# Patient Record
Sex: FEMALE | Race: OTHER | URBAN - METROPOLITAN AREA
[De-identification: names, ages, dates, MRNs, and addresses within clinical notes are randomized per-mention and may not be internally consistent; named-entity substitution may affect disease eponyms.]

---

## 2017-03-19 ENCOUNTER — EMERGENCY (EMERGENCY)
Facility: HOSPITAL | Age: 57
LOS: 0 days | Discharge: HOME | End: 2017-03-19
Attending: EMERGENCY MEDICINE | Admitting: EMERGENCY MEDICINE
Payer: COMMERCIAL

## 2017-03-19 VITALS
WEIGHT: 169.98 LBS | SYSTOLIC BLOOD PRESSURE: 140 MMHG | HEIGHT: 64 IN | DIASTOLIC BLOOD PRESSURE: 80 MMHG | RESPIRATION RATE: 18 BRPM | HEART RATE: 68 BPM | OXYGEN SATURATION: 100 %

## 2017-03-19 VITALS — RESPIRATION RATE: 16 BRPM | OXYGEN SATURATION: 100 % | HEART RATE: 78 BPM | SYSTOLIC BLOOD PRESSURE: 64 MMHG

## 2017-03-19 DIAGNOSIS — R73.9 HYPERGLYCEMIA, UNSPECIFIED: ICD-10-CM

## 2017-03-19 DIAGNOSIS — Z96.659 PRESENCE OF UNSPECIFIED ARTIFICIAL KNEE JOINT: Chronic | ICD-10-CM

## 2017-03-19 DIAGNOSIS — R10.33 PERIUMBILICAL PAIN: ICD-10-CM

## 2017-03-19 DIAGNOSIS — Z98.891 HISTORY OF UTERINE SCAR FROM PREVIOUS SURGERY: Chronic | ICD-10-CM

## 2017-03-19 DIAGNOSIS — R11.2 NAUSEA WITH VOMITING, UNSPECIFIED: ICD-10-CM

## 2017-03-19 DIAGNOSIS — Z98.891 HISTORY OF UTERINE SCAR FROM PREVIOUS SURGERY: ICD-10-CM

## 2017-03-19 DIAGNOSIS — R19.7 DIARRHEA, UNSPECIFIED: ICD-10-CM

## 2017-03-19 DIAGNOSIS — R31.9 HEMATURIA, UNSPECIFIED: ICD-10-CM

## 2017-03-19 DIAGNOSIS — R10.9 UNSPECIFIED ABDOMINAL PAIN: ICD-10-CM

## 2017-03-19 LAB
ALBUMIN SERPL ELPH-MCNC: 4.1 G/DL — SIGNIFICANT CHANGE UP (ref 3.3–5)
ALP SERPL-CCNC: 89 U/L — SIGNIFICANT CHANGE UP (ref 40–120)
ALT FLD-CCNC: 33 U/L — SIGNIFICANT CHANGE UP (ref 12–78)
ANION GAP SERPL CALC-SCNC: 11 MMOL/L — SIGNIFICANT CHANGE UP (ref 5–17)
APPEARANCE UR: CLEAR — SIGNIFICANT CHANGE UP
AST SERPL-CCNC: 25 U/L — SIGNIFICANT CHANGE UP (ref 15–37)
BACTERIA # UR AUTO: (no result)
BASOPHILS # BLD AUTO: 0.1 K/UL — SIGNIFICANT CHANGE UP (ref 0–0.2)
BASOPHILS NFR BLD AUTO: 0.4 % — SIGNIFICANT CHANGE UP (ref 0–2)
BILIRUB SERPL-MCNC: 0.2 MG/DL — SIGNIFICANT CHANGE UP (ref 0.2–1.2)
BILIRUB UR-MCNC: NEGATIVE — SIGNIFICANT CHANGE UP
BUN SERPL-MCNC: 15 MG/DL — SIGNIFICANT CHANGE UP (ref 7–23)
CALCIUM SERPL-MCNC: 8.8 MG/DL — SIGNIFICANT CHANGE UP (ref 8.5–10.1)
CHLORIDE SERPL-SCNC: 104 MMOL/L — SIGNIFICANT CHANGE UP (ref 96–108)
CK SERPL-CCNC: 124 U/L — SIGNIFICANT CHANGE UP (ref 26–192)
CO2 SERPL-SCNC: 25 MMOL/L — SIGNIFICANT CHANGE UP (ref 22–31)
COLOR SPEC: YELLOW — SIGNIFICANT CHANGE UP
CREAT SERPL-MCNC: 0.91 MG/DL — SIGNIFICANT CHANGE UP (ref 0.5–1.3)
DIFF PNL FLD: (no result)
EOSINOPHIL # BLD AUTO: 0 K/UL — SIGNIFICANT CHANGE UP (ref 0–0.5)
EOSINOPHIL NFR BLD AUTO: 0.1 % — SIGNIFICANT CHANGE UP (ref 0–6)
GLUCOSE SERPL-MCNC: 282 MG/DL — HIGH (ref 70–99)
GLUCOSE UR QL: 1000 MG/DL
HCT VFR BLD CALC: 40.7 % — SIGNIFICANT CHANGE UP (ref 34.5–45)
HGB BLD-MCNC: 13.3 G/DL — SIGNIFICANT CHANGE UP (ref 11.5–15.5)
KETONES UR-MCNC: (no result)
LACTATE SERPL-SCNC: 1.7 MMOL/L — SIGNIFICANT CHANGE UP (ref 0.7–2)
LACTATE SERPL-SCNC: 3.3 MMOL/L — HIGH (ref 0.7–2)
LEUKOCYTE ESTERASE UR-ACNC: NEGATIVE — SIGNIFICANT CHANGE UP
LIDOCAIN IGE QN: 174 U/L — SIGNIFICANT CHANGE UP (ref 73–393)
LYMPHOCYTES # BLD AUTO: 12.5 % — LOW (ref 13–44)
LYMPHOCYTES # BLD AUTO: 2 K/UL — SIGNIFICANT CHANGE UP (ref 1–3.3)
MCHC RBC-ENTMCNC: 27 PG — SIGNIFICANT CHANGE UP (ref 27–34)
MCHC RBC-ENTMCNC: 32.7 GM/DL — SIGNIFICANT CHANGE UP (ref 32–36)
MCV RBC AUTO: 82.6 FL — SIGNIFICANT CHANGE UP (ref 80–100)
MONOCYTES # BLD AUTO: 0.5 K/UL — SIGNIFICANT CHANGE UP (ref 0–0.9)
MONOCYTES NFR BLD AUTO: 3.4 % — SIGNIFICANT CHANGE UP (ref 2–14)
NEUTROPHILS # BLD AUTO: 13.3 K/UL — HIGH (ref 1.8–7.4)
NEUTROPHILS NFR BLD AUTO: 83.6 % — HIGH (ref 43–77)
NITRITE UR-MCNC: NEGATIVE — SIGNIFICANT CHANGE UP
PH UR: 7 — SIGNIFICANT CHANGE UP (ref 4.8–8)
PLATELET # BLD AUTO: 313 K/UL — SIGNIFICANT CHANGE UP (ref 150–400)
POTASSIUM SERPL-MCNC: 4 MMOL/L — SIGNIFICANT CHANGE UP (ref 3.5–5.3)
POTASSIUM SERPL-SCNC: 4 MMOL/L — SIGNIFICANT CHANGE UP (ref 3.5–5.3)
PROT SERPL-MCNC: 8.4 GM/DL — HIGH (ref 6–8.3)
PROT UR-MCNC: NEGATIVE MG/DL — SIGNIFICANT CHANGE UP
RBC # BLD: 4.93 M/UL — SIGNIFICANT CHANGE UP (ref 3.8–5.2)
RBC # FLD: 12.2 % — SIGNIFICANT CHANGE UP (ref 10.3–14.5)
RBC CASTS # UR COMP ASSIST: (no result) /HPF (ref 0–4)
SODIUM SERPL-SCNC: 140 MMOL/L — SIGNIFICANT CHANGE UP (ref 135–145)
SP GR SPEC: 1.01 — SIGNIFICANT CHANGE UP (ref 1.01–1.02)
TROPONIN I SERPL-MCNC: <0.015 NG/ML — SIGNIFICANT CHANGE UP (ref 0.01–0.04)
UROBILINOGEN FLD QL: NEGATIVE MG/DL — SIGNIFICANT CHANGE UP
WBC # BLD: 15.9 K/UL — HIGH (ref 3.8–10.5)
WBC # FLD AUTO: 15.9 K/UL — HIGH (ref 3.8–10.5)
WBC UR QL: SIGNIFICANT CHANGE UP

## 2017-03-19 PROCEDURE — 93010 ELECTROCARDIOGRAM REPORT: CPT

## 2017-03-19 PROCEDURE — 71020: CPT | Mod: 26

## 2017-03-19 PROCEDURE — 74177 CT ABD & PELVIS W/CONTRAST: CPT | Mod: 26

## 2017-03-19 PROCEDURE — 99285 EMERGENCY DEPT VISIT HI MDM: CPT

## 2017-03-19 RX ORDER — SODIUM CHLORIDE 9 MG/ML
1000 INJECTION INTRAMUSCULAR; INTRAVENOUS; SUBCUTANEOUS ONCE
Qty: 0 | Refills: 0 | Status: COMPLETED | OUTPATIENT
Start: 2017-03-19 | End: 2017-03-19

## 2017-03-19 RX ADMIN — SODIUM CHLORIDE 1000 MILLILITER(S): 9 INJECTION INTRAMUSCULAR; INTRAVENOUS; SUBCUTANEOUS at 08:48

## 2017-03-19 RX ADMIN — SODIUM CHLORIDE 1000 MILLILITER(S): 9 INJECTION INTRAMUSCULAR; INTRAVENOUS; SUBCUTANEOUS at 10:27

## 2017-03-19 NOTE — ED PROVIDER NOTE - MEDICAL DECISION MAKING DETAILS
56 yo female without PMH here with acute Abd pain and associated CP. Physical exam shows nontender abdomen and normal cardiovascular exam. Consider ACS vs SBO vs Pancreatitis vs GERD vs Gastritis vs Esophageal dysfunction. Plan CBC, CMP, Cardiacs, CXR, EKG, Lipase, UA, IVF, CT abd, Symptom control as needed and reassess.

## 2017-03-19 NOTE — ED PROVIDER NOTE - ATTENDING CONTRIBUTION TO CARE
Dr. Stroud: I have personally performed a face to face bedside history and physical examination of this patient. I have discussed the history, examination, review of systems, assessment and plan of management with the resident. I have reviewed the electronic medical record and amended it to reflect my history, review of systems, physical exam, assessment and plan.

## 2017-03-19 NOTE — ED PROVIDER NOTE - PROGRESS NOTE DETAILS
ED Attdg MD Note, Dr. Stroud:    Case d/w resident.  Pt seen/evaluated.  Agree w/ initial ED management.  58 yo F, past C/S & tubal ligation, no other PMH,  BIBA from home c/o'ing severe mid-lower abd. pain awakening her up from sleep ~ 4 AM.  + Associated N/V X 3 w/o relief, + BM & flatus this AM.  Pain dull, no back pain, urine c/o's, F/C, LOC.  Some associated R chest discomfort temporarily w/o SOB.  Pain self-resolved en route to ED.  Pt reports minimal abd./ discomfort presently.  VS noted.  F adult in good comfort, not acutely ill-appearing.  PERRL/EOMI.  Neck NT, supple.  CV RRR, normal radial pulse.  Lungs CTA.  Abd: soft, BS+, no focal tenderness.  No flank/CVAT.  Exts: NT, DICKERSON x 4.  Skin: no tactile warmth, no rash.  Neuro: A+O x 3, normal speech, no focal motor/sensory deficits.   Pt declines pain meds at this time.  Labs, CT A/P pending. Patient reassessed and reports no abd pain or nausea or vomiting or diarrhea while in the ER. Abd soft, nontender, nondistended, no guarding, no rebound.

## 2017-03-19 NOTE — ED PROVIDER NOTE - CARE PLAN
Principal Discharge DX:	Periumbilical abdominal pain  Instructions for follow-up, activity and diet:	Home to follow up with her PMD and to obtain and diabetes work up as an outpatient. Principal Discharge DX:	Periumbilical abdominal pain  Instructions for follow-up, activity and diet:	Home to follow up with her PMD and to obtain and diabetes work up as an outpatient.  Secondary Diagnosis:	Hematuria Principal Discharge DX:	Periumbilical abdominal pain  Instructions for follow-up, activity and diet:	Home to follow up with her PMD and to obtain and diabetes work up as an outpatient.  Secondary Diagnosis:	Hematuria  Secondary Diagnosis:	Hyperglycemia

## 2017-03-19 NOTE — ED PROVIDER NOTE - PHYSICAL EXAMINATION
A/OX3. Well appearing female. Lungs CTAB. Cardiac S1S2 noted, RRR. Abd soft, nontender, nondistended. No CVA tenderness. No focal weakness. No LE edema. EOMI, PERRL.

## 2017-05-23 ENCOUNTER — EMERGENCY (EMERGENCY)
Facility: HOSPITAL | Age: 57
LOS: 0 days | Discharge: HOME | End: 2017-05-23
Attending: EMERGENCY MEDICINE | Admitting: EMERGENCY MEDICINE
Payer: COMMERCIAL

## 2017-05-23 VITALS
SYSTOLIC BLOOD PRESSURE: 127 MMHG | RESPIRATION RATE: 17 BRPM | TEMPERATURE: 98 F | OXYGEN SATURATION: 100 % | HEART RATE: 77 BPM | DIASTOLIC BLOOD PRESSURE: 68 MMHG

## 2017-05-23 VITALS — HEIGHT: 66 IN | WEIGHT: 169.98 LBS

## 2017-05-23 DIAGNOSIS — R19.7 DIARRHEA, UNSPECIFIED: ICD-10-CM

## 2017-05-23 DIAGNOSIS — N20.0 CALCULUS OF KIDNEY: ICD-10-CM

## 2017-05-23 DIAGNOSIS — Z98.891 HISTORY OF UTERINE SCAR FROM PREVIOUS SURGERY: Chronic | ICD-10-CM

## 2017-05-23 DIAGNOSIS — Z96.659 PRESENCE OF UNSPECIFIED ARTIFICIAL KNEE JOINT: Chronic | ICD-10-CM

## 2017-05-23 DIAGNOSIS — R10.9 UNSPECIFIED ABDOMINAL PAIN: ICD-10-CM

## 2017-05-23 LAB
ALBUMIN SERPL ELPH-MCNC: 3.9 G/DL — SIGNIFICANT CHANGE UP (ref 3.3–5)
ALP SERPL-CCNC: 88 U/L — SIGNIFICANT CHANGE UP (ref 40–120)
ALT FLD-CCNC: 38 U/L — SIGNIFICANT CHANGE UP (ref 12–78)
ANION GAP SERPL CALC-SCNC: 10 MMOL/L — SIGNIFICANT CHANGE UP (ref 5–17)
APPEARANCE UR: (no result)
AST SERPL-CCNC: 25 U/L — SIGNIFICANT CHANGE UP (ref 15–37)
BACTERIA # UR AUTO: (no result)
BASOPHILS # BLD AUTO: 0.1 K/UL — SIGNIFICANT CHANGE UP (ref 0–0.2)
BASOPHILS NFR BLD AUTO: 0.4 % — SIGNIFICANT CHANGE UP (ref 0–2)
BILIRUB SERPL-MCNC: 0.3 MG/DL — SIGNIFICANT CHANGE UP (ref 0.2–1.2)
BILIRUB UR-MCNC: NEGATIVE — SIGNIFICANT CHANGE UP
BUN SERPL-MCNC: 13 MG/DL — SIGNIFICANT CHANGE UP (ref 7–23)
CALCIUM SERPL-MCNC: 9.2 MG/DL — SIGNIFICANT CHANGE UP (ref 8.5–10.1)
CHLORIDE SERPL-SCNC: 103 MMOL/L — SIGNIFICANT CHANGE UP (ref 96–108)
CO2 SERPL-SCNC: 24 MMOL/L — SIGNIFICANT CHANGE UP (ref 22–31)
COLOR SPEC: YELLOW — SIGNIFICANT CHANGE UP
CREAT SERPL-MCNC: 1.06 MG/DL — SIGNIFICANT CHANGE UP (ref 0.5–1.3)
DIFF PNL FLD: (no result)
EOSINOPHIL # BLD AUTO: 0 K/UL — SIGNIFICANT CHANGE UP (ref 0–0.5)
EOSINOPHIL NFR BLD AUTO: 0.1 % — SIGNIFICANT CHANGE UP (ref 0–6)
EPI CELLS # UR: SIGNIFICANT CHANGE UP
GLUCOSE SERPL-MCNC: 228 MG/DL — HIGH (ref 70–99)
GLUCOSE UR QL: 1000 MG/DL
HCT VFR BLD CALC: 41 % — SIGNIFICANT CHANGE UP (ref 34.5–45)
HGB BLD-MCNC: 13.2 G/DL — SIGNIFICANT CHANGE UP (ref 11.5–15.5)
KETONES UR-MCNC: (no result)
LEUKOCYTE ESTERASE UR-ACNC: (no result)
LIDOCAIN IGE QN: 211 U/L — SIGNIFICANT CHANGE UP (ref 73–393)
LYMPHOCYTES # BLD AUTO: 1.4 K/UL — SIGNIFICANT CHANGE UP (ref 1–3.3)
LYMPHOCYTES # BLD AUTO: 9.1 % — LOW (ref 13–44)
MCHC RBC-ENTMCNC: 27 PG — SIGNIFICANT CHANGE UP (ref 27–34)
MCHC RBC-ENTMCNC: 32.3 GM/DL — SIGNIFICANT CHANGE UP (ref 32–36)
MCV RBC AUTO: 83.7 FL — SIGNIFICANT CHANGE UP (ref 80–100)
MONOCYTES # BLD AUTO: 0.4 K/UL — SIGNIFICANT CHANGE UP (ref 0–0.9)
MONOCYTES NFR BLD AUTO: 2.8 % — SIGNIFICANT CHANGE UP (ref 2–14)
NEUTROPHILS # BLD AUTO: 13.9 K/UL — HIGH (ref 1.8–7.4)
NEUTROPHILS NFR BLD AUTO: 87.6 % — HIGH (ref 43–77)
NITRITE UR-MCNC: POSITIVE
PH UR: 8 — SIGNIFICANT CHANGE UP (ref 5–8)
PLATELET # BLD AUTO: 338 K/UL — SIGNIFICANT CHANGE UP (ref 150–400)
POTASSIUM SERPL-MCNC: 4.3 MMOL/L — SIGNIFICANT CHANGE UP (ref 3.5–5.3)
POTASSIUM SERPL-SCNC: 4.3 MMOL/L — SIGNIFICANT CHANGE UP (ref 3.5–5.3)
PROT SERPL-MCNC: 8.6 GM/DL — HIGH (ref 6–8.3)
PROT UR-MCNC: 30 MG/DL
RBC # BLD: 4.9 M/UL — SIGNIFICANT CHANGE UP (ref 3.8–5.2)
RBC # FLD: 12.6 % — SIGNIFICANT CHANGE UP (ref 10.3–14.5)
RBC CASTS # UR COMP ASSIST: >50 /HPF (ref 0–4)
SODIUM SERPL-SCNC: 137 MMOL/L — SIGNIFICANT CHANGE UP (ref 135–145)
SP GR SPEC: 1.01 — SIGNIFICANT CHANGE UP (ref 1.01–1.02)
UROBILINOGEN FLD QL: NEGATIVE MG/DL — SIGNIFICANT CHANGE UP
WBC # BLD: 15.9 K/UL — HIGH (ref 3.8–10.5)
WBC # FLD AUTO: 15.9 K/UL — HIGH (ref 3.8–10.5)
WBC UR QL: SIGNIFICANT CHANGE UP

## 2017-05-23 PROCEDURE — 74177 CT ABD & PELVIS W/CONTRAST: CPT | Mod: 26

## 2017-05-23 PROCEDURE — 99284 EMERGENCY DEPT VISIT MOD MDM: CPT

## 2017-05-23 RX ORDER — CIPROFLOXACIN LACTATE 400MG/40ML
400 VIAL (ML) INTRAVENOUS ONCE
Qty: 0 | Refills: 0 | Status: COMPLETED | OUTPATIENT
Start: 2017-05-23 | End: 2017-05-23

## 2017-05-23 RX ORDER — ONDANSETRON 8 MG/1
4 TABLET, FILM COATED ORAL ONCE
Qty: 0 | Refills: 0 | Status: COMPLETED | OUTPATIENT
Start: 2017-05-23 | End: 2017-05-23

## 2017-05-23 RX ORDER — ONDANSETRON 8 MG/1
1 TABLET, FILM COATED ORAL
Qty: 21 | Refills: 0 | OUTPATIENT
Start: 2017-05-23 | End: 2017-05-30

## 2017-05-23 RX ORDER — SODIUM CHLORIDE 9 MG/ML
1000 INJECTION INTRAMUSCULAR; INTRAVENOUS; SUBCUTANEOUS ONCE
Qty: 0 | Refills: 0 | Status: COMPLETED | OUTPATIENT
Start: 2017-05-23 | End: 2017-05-23

## 2017-05-23 RX ORDER — MOXIFLOXACIN HYDROCHLORIDE TABLETS, 400 MG 400 MG/1
1 TABLET, FILM COATED ORAL
Qty: 20 | Refills: 0 | OUTPATIENT
Start: 2017-05-23 | End: 2017-05-27

## 2017-05-23 RX ORDER — MORPHINE SULFATE 50 MG/1
4 CAPSULE, EXTENDED RELEASE ORAL ONCE
Qty: 0 | Refills: 0 | Status: DISCONTINUED | OUTPATIENT
Start: 2017-05-23 | End: 2017-05-23

## 2017-05-23 RX ORDER — MORPHINE SULFATE 50 MG/1
6 CAPSULE, EXTENDED RELEASE ORAL ONCE
Qty: 0 | Refills: 0 | Status: DISCONTINUED | OUTPATIENT
Start: 2017-05-23 | End: 2017-05-23

## 2017-05-23 RX ORDER — OXYCODONE HYDROCHLORIDE 5 MG/1
1 TABLET ORAL
Qty: 10 | Refills: 0 | OUTPATIENT
Start: 2017-05-23 | End: 2017-05-28

## 2017-05-23 RX ADMIN — MORPHINE SULFATE 6 MILLIGRAM(S): 50 CAPSULE, EXTENDED RELEASE ORAL at 20:00

## 2017-05-23 RX ADMIN — MORPHINE SULFATE 4 MILLIGRAM(S): 50 CAPSULE, EXTENDED RELEASE ORAL at 17:19

## 2017-05-23 RX ADMIN — MORPHINE SULFATE 6 MILLIGRAM(S): 50 CAPSULE, EXTENDED RELEASE ORAL at 20:30

## 2017-05-23 RX ADMIN — ONDANSETRON 4 MILLIGRAM(S): 8 TABLET, FILM COATED ORAL at 20:00

## 2017-05-23 RX ADMIN — SODIUM CHLORIDE 1000 MILLILITER(S): 9 INJECTION INTRAMUSCULAR; INTRAVENOUS; SUBCUTANEOUS at 17:19

## 2017-05-23 RX ADMIN — ONDANSETRON 4 MILLIGRAM(S): 8 TABLET, FILM COATED ORAL at 17:19

## 2017-05-23 RX ADMIN — Medication 200 MILLIGRAM(S): at 22:08

## 2017-05-23 RX ADMIN — MORPHINE SULFATE 4 MILLIGRAM(S): 50 CAPSULE, EXTENDED RELEASE ORAL at 17:49

## 2017-05-23 NOTE — ED STATDOCS - OBJECTIVE STATEMENT
56 y/o female with PSHx , 26 years ago c/o diffuse abd pain x few hours ago, worse on right side. Last BM was this morning, vomited about 8 times today and had diarrhea. pt was here 2 months ago for same abd pain and a CT scan was done that found no significant results. Allergic to PCN. Recent travel to [place] 1 month ago. Non smoker. No alcohol or drug use. Dr. Miranda, PMD.

## 2017-05-23 NOTE — ED STATDOCS - PROGRESS NOTE DETAILS
58 y/o female without PMH here with severe abd pain. Patient reports periumbilical and diffuse abd pain since this AM reported as sharp/throbbing and associated with NBNB vomiting and one episode of diarrhea. Patient recently came back from [place]. Denies fever, melena, BRBPR, back pain, hx of kidney stones, urinary symptoms, LE edema, hx of SBO. Exam shows diffuse abd tenderness w/o rebound, w/o guarding. No CVA tenderness. Normal lung exam. Normal skin. No LE edema. Consider appendicitis, SBO, Colitis, Diverticulitis, Ovarian torsion, UTI, Kidney stone. Plan CBC, CMP, CXR, UA, IVF, Pain control and reassess.

## 2017-05-23 NOTE — ED STATDOCS - ATTENDING CONTRIBUTION TO CARE
I, Jerman Delaney MD,  performed the initial face to face bedside interview with this patient regarding history of present illness, review of symptoms and relevant past medical, social and family history.  I completed an independent physical examination.  I was the initial provider who evaluated this patient. I have signed out the follow up of any pending tests (i.e. labs, radiological studies) to the resident.  I have communicated the patient’s plan of care and disposition with the resident.  The history, relevant review of systems, past medical and surgical history, medical decision making, and physical examination was documented by the scribe in my presence and I attest to the accuracy of the documentation.

## 2017-05-23 NOTE — ED STATDOCS - NS ED MD SCRIBE ATTENDING SCRIBE SECTIONS
HISTORY OF PRESENT ILLNESS/PAST MEDICAL/SURGICAL/SOCIAL HISTORY/REVIEW OF SYSTEMS/PHYSICAL EXAM/DISPOSITION/VITAL SIGNS( Pullset)/PROGRESS NOTE/RESULTS

## 2017-05-23 NOTE — ED STATDOCS - MEDICAL DECISION MAKING DETAILS
56 y/o female presents to the ED c/o abd pain. Will order labs, CT scan abd and meds. Then re-assess.

## 2017-05-24 LAB
CULTURE RESULTS: SIGNIFICANT CHANGE UP
SPECIMEN SOURCE: SIGNIFICANT CHANGE UP

## 2017-05-28 ENCOUNTER — INPATIENT (INPATIENT)
Facility: HOSPITAL | Age: 57
LOS: 1 days | Discharge: HOME | End: 2017-05-30
Attending: INTERNAL MEDICINE | Admitting: INTERNAL MEDICINE
Payer: COMMERCIAL

## 2017-05-28 VITALS
TEMPERATURE: 98 F | HEART RATE: 86 BPM | DIASTOLIC BLOOD PRESSURE: 86 MMHG | OXYGEN SATURATION: 100 % | SYSTOLIC BLOOD PRESSURE: 137 MMHG | HEIGHT: 67 IN | RESPIRATION RATE: 18 BRPM | WEIGHT: 179.9 LBS

## 2017-05-28 DIAGNOSIS — Z98.891 HISTORY OF UTERINE SCAR FROM PREVIOUS SURGERY: Chronic | ICD-10-CM

## 2017-05-28 DIAGNOSIS — Z96.659 PRESENCE OF UNSPECIFIED ARTIFICIAL KNEE JOINT: Chronic | ICD-10-CM

## 2017-05-28 LAB
ALBUMIN SERPL ELPH-MCNC: 4 G/DL — SIGNIFICANT CHANGE UP (ref 3.3–5)
ALP SERPL-CCNC: 82 U/L — SIGNIFICANT CHANGE UP (ref 40–120)
ALT FLD-CCNC: 38 U/L — SIGNIFICANT CHANGE UP (ref 12–78)
ANION GAP SERPL CALC-SCNC: 9 MMOL/L — SIGNIFICANT CHANGE UP (ref 5–17)
APPEARANCE UR: CLEAR — SIGNIFICANT CHANGE UP
AST SERPL-CCNC: 24 U/L — SIGNIFICANT CHANGE UP (ref 15–37)
BACTERIA # UR AUTO: (no result)
BASOPHILS # BLD AUTO: 0.1 K/UL — SIGNIFICANT CHANGE UP (ref 0–0.2)
BASOPHILS NFR BLD AUTO: 0.4 % — SIGNIFICANT CHANGE UP (ref 0–2)
BILIRUB SERPL-MCNC: 0.6 MG/DL — SIGNIFICANT CHANGE UP (ref 0.2–1.2)
BILIRUB UR-MCNC: NEGATIVE — SIGNIFICANT CHANGE UP
BUN SERPL-MCNC: 16 MG/DL — SIGNIFICANT CHANGE UP (ref 7–23)
CALCIUM SERPL-MCNC: 9.1 MG/DL — SIGNIFICANT CHANGE UP (ref 8.5–10.1)
CHLORIDE SERPL-SCNC: 103 MMOL/L — SIGNIFICANT CHANGE UP (ref 96–108)
CO2 SERPL-SCNC: 24 MMOL/L — SIGNIFICANT CHANGE UP (ref 22–31)
COLOR SPEC: YELLOW — SIGNIFICANT CHANGE UP
COMMENT - URINE: SIGNIFICANT CHANGE UP
CREAT SERPL-MCNC: 1.29 MG/DL — SIGNIFICANT CHANGE UP (ref 0.5–1.3)
DIFF PNL FLD: (no result)
EOSINOPHIL # BLD AUTO: 0 K/UL — SIGNIFICANT CHANGE UP (ref 0–0.5)
EOSINOPHIL NFR BLD AUTO: 0.2 % — SIGNIFICANT CHANGE UP (ref 0–6)
EPI CELLS # UR: SIGNIFICANT CHANGE UP
GLUCOSE SERPL-MCNC: 222 MG/DL — HIGH (ref 70–99)
GLUCOSE UR QL: 250 MG/DL
HCT VFR BLD CALC: 39.9 % — SIGNIFICANT CHANGE UP (ref 34.5–45)
HGB BLD-MCNC: 13.5 G/DL — SIGNIFICANT CHANGE UP (ref 11.5–15.5)
KETONES UR-MCNC: (no result)
LEUKOCYTE ESTERASE UR-ACNC: (no result)
LYMPHOCYTES # BLD AUTO: 14.3 % — SIGNIFICANT CHANGE UP (ref 13–44)
LYMPHOCYTES # BLD AUTO: 2.1 K/UL — SIGNIFICANT CHANGE UP (ref 1–3.3)
MCHC RBC-ENTMCNC: 27.3 PG — SIGNIFICANT CHANGE UP (ref 27–34)
MCHC RBC-ENTMCNC: 34 GM/DL — SIGNIFICANT CHANGE UP (ref 32–36)
MCV RBC AUTO: 80.3 FL — SIGNIFICANT CHANGE UP (ref 80–100)
MONOCYTES # BLD AUTO: 0.7 K/UL — SIGNIFICANT CHANGE UP (ref 0–0.9)
MONOCYTES NFR BLD AUTO: 4.8 % — SIGNIFICANT CHANGE UP (ref 2–14)
NEUTROPHILS # BLD AUTO: 11.6 K/UL — HIGH (ref 1.8–7.4)
NEUTROPHILS NFR BLD AUTO: 80.3 % — HIGH (ref 43–77)
NITRITE UR-MCNC: NEGATIVE — SIGNIFICANT CHANGE UP
PH UR: 6.5 — SIGNIFICANT CHANGE UP (ref 5–8)
PLATELET # BLD AUTO: 343 K/UL — SIGNIFICANT CHANGE UP (ref 150–400)
POTASSIUM SERPL-MCNC: 4.2 MMOL/L — SIGNIFICANT CHANGE UP (ref 3.5–5.3)
POTASSIUM SERPL-SCNC: 4.2 MMOL/L — SIGNIFICANT CHANGE UP (ref 3.5–5.3)
PROT SERPL-MCNC: 8.6 GM/DL — HIGH (ref 6–8.3)
PROT UR-MCNC: 30 MG/DL
RBC # BLD: 4.96 M/UL — SIGNIFICANT CHANGE UP (ref 3.8–5.2)
RBC # FLD: 12.2 % — SIGNIFICANT CHANGE UP (ref 10.3–14.5)
RBC CASTS # UR COMP ASSIST: (no result) /HPF (ref 0–4)
SODIUM SERPL-SCNC: 136 MMOL/L — SIGNIFICANT CHANGE UP (ref 135–145)
SP GR SPEC: 1.02 — SIGNIFICANT CHANGE UP (ref 1.01–1.02)
UROBILINOGEN FLD QL: NEGATIVE MG/DL — SIGNIFICANT CHANGE UP
WBC # BLD: 14.5 K/UL — HIGH (ref 3.8–10.5)
WBC # FLD AUTO: 14.5 K/UL — HIGH (ref 3.8–10.5)
WBC UR QL: SIGNIFICANT CHANGE UP

## 2017-05-28 PROCEDURE — 99284 EMERGENCY DEPT VISIT MOD MDM: CPT

## 2017-05-28 RX ORDER — ENOXAPARIN SODIUM 100 MG/ML
40 INJECTION SUBCUTANEOUS EVERY 24 HOURS
Qty: 0 | Refills: 0 | Status: DISCONTINUED | OUTPATIENT
Start: 2017-05-28 | End: 2017-05-30

## 2017-05-28 RX ORDER — ONDANSETRON 8 MG/1
4 TABLET, FILM COATED ORAL ONCE
Qty: 0 | Refills: 0 | Status: COMPLETED | OUTPATIENT
Start: 2017-05-28 | End: 2017-05-28

## 2017-05-28 RX ORDER — AZTREONAM 2 G
1000 VIAL (EA) INJECTION EVERY 8 HOURS
Qty: 0 | Refills: 0 | Status: DISCONTINUED | OUTPATIENT
Start: 2017-05-28 | End: 2017-05-28

## 2017-05-28 RX ORDER — ONDANSETRON 8 MG/1
8 TABLET, FILM COATED ORAL ONCE
Qty: 0 | Refills: 0 | Status: COMPLETED | OUTPATIENT
Start: 2017-05-28 | End: 2017-05-28

## 2017-05-28 RX ORDER — SODIUM CHLORIDE 9 MG/ML
1000 INJECTION INTRAMUSCULAR; INTRAVENOUS; SUBCUTANEOUS
Qty: 0 | Refills: 0 | Status: DISCONTINUED | OUTPATIENT
Start: 2017-05-28 | End: 2017-05-30

## 2017-05-28 RX ORDER — CEFTRIAXONE 500 MG/1
1 INJECTION, POWDER, FOR SOLUTION INTRAMUSCULAR; INTRAVENOUS EVERY 24 HOURS
Qty: 0 | Refills: 0 | Status: DISCONTINUED | OUTPATIENT
Start: 2017-05-29 | End: 2017-05-30

## 2017-05-28 RX ORDER — DEXTROSE 50 % IN WATER 50 %
12.5 SYRINGE (ML) INTRAVENOUS ONCE
Qty: 0 | Refills: 0 | Status: DISCONTINUED | OUTPATIENT
Start: 2017-05-28 | End: 2017-05-30

## 2017-05-28 RX ORDER — ONDANSETRON 8 MG/1
4 TABLET, FILM COATED ORAL EVERY 6 HOURS
Qty: 0 | Refills: 0 | Status: DISCONTINUED | OUTPATIENT
Start: 2017-05-28 | End: 2017-05-30

## 2017-05-28 RX ORDER — SODIUM CHLORIDE 9 MG/ML
1000 INJECTION, SOLUTION INTRAVENOUS
Qty: 0 | Refills: 0 | Status: DISCONTINUED | OUTPATIENT
Start: 2017-05-28 | End: 2017-05-30

## 2017-05-28 RX ORDER — AZTREONAM 2 G
VIAL (EA) INJECTION
Qty: 0 | Refills: 0 | Status: DISCONTINUED | OUTPATIENT
Start: 2017-05-28 | End: 2017-05-28

## 2017-05-28 RX ORDER — GLUCAGON INJECTION, SOLUTION 0.5 MG/.1ML
1 INJECTION, SOLUTION SUBCUTANEOUS ONCE
Qty: 0 | Refills: 0 | Status: DISCONTINUED | OUTPATIENT
Start: 2017-05-28 | End: 2017-05-30

## 2017-05-28 RX ORDER — DEXTROSE 50 % IN WATER 50 %
1 SYRINGE (ML) INTRAVENOUS ONCE
Qty: 0 | Refills: 0 | Status: DISCONTINUED | OUTPATIENT
Start: 2017-05-28 | End: 2017-05-30

## 2017-05-28 RX ORDER — SODIUM CHLORIDE 9 MG/ML
1000 INJECTION INTRAMUSCULAR; INTRAVENOUS; SUBCUTANEOUS ONCE
Qty: 0 | Refills: 0 | Status: COMPLETED | OUTPATIENT
Start: 2017-05-28 | End: 2017-05-28

## 2017-05-28 RX ORDER — METOCLOPRAMIDE HCL 10 MG
10 TABLET ORAL ONCE
Qty: 0 | Refills: 0 | Status: COMPLETED | OUTPATIENT
Start: 2017-05-28 | End: 2017-05-28

## 2017-05-28 RX ORDER — DEXTROSE 50 % IN WATER 50 %
25 SYRINGE (ML) INTRAVENOUS ONCE
Qty: 0 | Refills: 0 | Status: DISCONTINUED | OUTPATIENT
Start: 2017-05-28 | End: 2017-05-30

## 2017-05-28 RX ORDER — CEFTRIAXONE 500 MG/1
INJECTION, POWDER, FOR SOLUTION INTRAMUSCULAR; INTRAVENOUS
Qty: 0 | Refills: 0 | Status: DISCONTINUED | OUTPATIENT
Start: 2017-05-28 | End: 2017-05-30

## 2017-05-28 RX ORDER — CEFTRIAXONE 500 MG/1
1 INJECTION, POWDER, FOR SOLUTION INTRAMUSCULAR; INTRAVENOUS ONCE
Qty: 0 | Refills: 0 | Status: COMPLETED | OUTPATIENT
Start: 2017-05-28 | End: 2017-05-28

## 2017-05-28 RX ORDER — HYDROMORPHONE HYDROCHLORIDE 2 MG/ML
1 INJECTION INTRAMUSCULAR; INTRAVENOUS; SUBCUTANEOUS EVERY 4 HOURS
Qty: 0 | Refills: 0 | Status: DISCONTINUED | OUTPATIENT
Start: 2017-05-28 | End: 2017-05-30

## 2017-05-28 RX ORDER — INSULIN LISPRO 100/ML
VIAL (ML) SUBCUTANEOUS
Qty: 0 | Refills: 0 | Status: DISCONTINUED | OUTPATIENT
Start: 2017-05-28 | End: 2017-05-30

## 2017-05-28 RX ORDER — KETOROLAC TROMETHAMINE 30 MG/ML
30 SYRINGE (ML) INJECTION ONCE
Qty: 0 | Refills: 0 | Status: DISCONTINUED | OUTPATIENT
Start: 2017-05-28 | End: 2017-05-28

## 2017-05-28 RX ORDER — AZTREONAM 2 G
1000 VIAL (EA) INJECTION ONCE
Qty: 0 | Refills: 0 | Status: DISCONTINUED | OUTPATIENT
Start: 2017-05-28 | End: 2017-05-28

## 2017-05-28 RX ORDER — FAMOTIDINE 10 MG/ML
20 INJECTION INTRAVENOUS ONCE
Qty: 0 | Refills: 0 | Status: COMPLETED | OUTPATIENT
Start: 2017-05-28 | End: 2017-05-28

## 2017-05-28 RX ORDER — SENNA PLUS 8.6 MG/1
2 TABLET ORAL AT BEDTIME
Qty: 0 | Refills: 0 | Status: DISCONTINUED | OUTPATIENT
Start: 2017-05-28 | End: 2017-05-30

## 2017-05-28 RX ORDER — DOCUSATE SODIUM 100 MG
100 CAPSULE ORAL THREE TIMES A DAY
Qty: 0 | Refills: 0 | Status: DISCONTINUED | OUTPATIENT
Start: 2017-05-28 | End: 2017-05-30

## 2017-05-28 RX ORDER — ACETAMINOPHEN 500 MG
650 TABLET ORAL EVERY 6 HOURS
Qty: 0 | Refills: 0 | Status: DISCONTINUED | OUTPATIENT
Start: 2017-05-28 | End: 2017-05-30

## 2017-05-28 RX ORDER — DIPHENHYDRAMINE HCL 50 MG
25 CAPSULE ORAL ONCE
Qty: 0 | Refills: 0 | Status: COMPLETED | OUTPATIENT
Start: 2017-05-28 | End: 2017-05-28

## 2017-05-28 RX ORDER — CIPROFLOXACIN LACTATE 400MG/40ML
400 VIAL (ML) INTRAVENOUS ONCE
Qty: 0 | Refills: 0 | Status: COMPLETED | OUTPATIENT
Start: 2017-05-28 | End: 2017-05-28

## 2017-05-28 RX ADMIN — ONDANSETRON 4 MILLIGRAM(S): 8 TABLET, FILM COATED ORAL at 16:29

## 2017-05-28 RX ADMIN — CEFTRIAXONE 100 GRAM(S): 500 INJECTION, POWDER, FOR SOLUTION INTRAMUSCULAR; INTRAVENOUS at 17:57

## 2017-05-28 RX ADMIN — CEFTRIAXONE 100 GRAM(S): 500 INJECTION, POWDER, FOR SOLUTION INTRAMUSCULAR; INTRAVENOUS at 11:27

## 2017-05-28 RX ADMIN — Medication 25 MILLIGRAM(S): at 06:52

## 2017-05-28 RX ADMIN — SODIUM CHLORIDE 2000 MILLILITER(S): 9 INJECTION INTRAMUSCULAR; INTRAVENOUS; SUBCUTANEOUS at 11:19

## 2017-05-28 RX ADMIN — FAMOTIDINE 20 MILLIGRAM(S): 10 INJECTION INTRAVENOUS at 03:04

## 2017-05-28 RX ADMIN — HYDROMORPHONE HYDROCHLORIDE 1 MILLIGRAM(S): 2 INJECTION INTRAMUSCULAR; INTRAVENOUS; SUBCUTANEOUS at 18:44

## 2017-05-28 RX ADMIN — HYDROMORPHONE HYDROCHLORIDE 1 MILLIGRAM(S): 2 INJECTION INTRAMUSCULAR; INTRAVENOUS; SUBCUTANEOUS at 23:55

## 2017-05-28 RX ADMIN — Medication 30 MILLIGRAM(S): at 03:20

## 2017-05-28 RX ADMIN — ONDANSETRON 4 MILLIGRAM(S): 8 TABLET, FILM COATED ORAL at 11:19

## 2017-05-28 RX ADMIN — Medication 200 MILLIGRAM(S): at 03:38

## 2017-05-28 RX ADMIN — ENOXAPARIN SODIUM 40 MILLIGRAM(S): 100 INJECTION SUBCUTANEOUS at 17:58

## 2017-05-28 RX ADMIN — ONDANSETRON 4 MILLIGRAM(S): 8 TABLET, FILM COATED ORAL at 13:50

## 2017-05-28 RX ADMIN — ONDANSETRON 8 MILLIGRAM(S): 8 TABLET, FILM COATED ORAL at 03:05

## 2017-05-28 RX ADMIN — Medication 100 MILLIGRAM(S): at 21:31

## 2017-05-28 RX ADMIN — SODIUM CHLORIDE 1000 MILLILITER(S): 9 INJECTION INTRAMUSCULAR; INTRAVENOUS; SUBCUTANEOUS at 03:05

## 2017-05-28 RX ADMIN — Medication 10 MILLIGRAM(S): at 06:53

## 2017-05-28 RX ADMIN — Medication 30 MILLIGRAM(S): at 03:04

## 2017-05-28 RX ADMIN — HYDROMORPHONE HYDROCHLORIDE 1 MILLIGRAM(S): 2 INJECTION INTRAMUSCULAR; INTRAVENOUS; SUBCUTANEOUS at 18:32

## 2017-05-28 NOTE — ED PROVIDER NOTE - DETAILS:
Olamide Jacome MD - The scribe's documentation has been prepared under my direction and personally reviewed by me in its entirety. I confirm that the note above accurately reflects all work, treatment, procedures, and medical decision making performed by me.

## 2017-05-28 NOTE — CONSULT NOTE ADULT - SUBJECTIVE AND OBJECTIVE BOX
Patient is a 57y old  Female who presents with a chief complaint of pain and nausea (28 May 2017 14:57)    HPI:  56 y/o Female with h/o borderline DM type 2, recent right UVJ kidney stone and UTI was admitted on  for recurrent right flank pain. She was seen in ER on 17 and was dx with 3 mm right UVJ stone with right hydroureteronephrosis and UTI and was d/pushpa on cipro PO. SHe was uncompliant with taking the cipro due to N/V. She came back sec to increasing right flank pain, n/v. Denies any fever.       PMH: as above  PSH: as above  Meds: per reconciliation sheet, noted below  MEDICATIONS  (STANDING):  enoxaparin Injectable 40milliGRAM(s) SubCutaneous every 24 hours  sodium chloride 0.9%. 1000milliLiter(s) IV Continuous <Continuous>  docusate sodium 100milliGRAM(s) Oral three times a day  aztreonam  IVPB  IV Intermittent   aztreonam  IVPB 1000milliGRAM(s) IV Intermittent once  aztreonam  IVPB 1000milliGRAM(s) IV Intermittent every 8 hours  insulin lispro (HumaLOG) corrective regimen sliding scale  SubCutaneous three times a day before meals  dextrose 5%. 1000milliLiter(s) IV Continuous <Continuous>  dextrose 50% Injectable 12.5Gram(s) IV Push once  dextrose 50% Injectable 25Gram(s) IV Push once  dextrose 50% Injectable 25Gram(s) IV Push once    MEDICATIONS  (PRN):  acetaminophen   Tablet. 650milliGRAM(s) Oral every 6 hours PRN Mild Pain (1 - 3)  ondansetron Injectable 4milliGRAM(s) IV Push every 6 hours PRN Nausea  senna 2Tablet(s) Oral at bedtime PRN Constipation  HYDROmorphone  Injectable 1milliGRAM(s) IV Push every 4 hours PRN Moderate Pain (4 - 6)  dextrose Gel 1Dose(s) Oral once PRN Blood Glucose LESS THAN 70 milliGRAM(s)/deciliter  glucagon  Injectable 1milliGRAM(s) IntraMuscular once PRN Glucose LESS THAN 70 milligrams/deciliter    Allergies    penicillins (Hives)    Intolerances      Social: no smoking, no alcohol, no illegal drugs; no recent travel, no exposure to TB  FAMILY HISTORY:  No pertinent family history in first degree relatives    ROS: the patient denies fever, no chills, no HA, no dizziness, no sore throat, no blurry vision, no CP, no palpitations, no SOB, no cough, no abdominal pain, has right flank pain, no diarrhea, had N/V, no dysuria, no leg pain, no claudication, no rash, no joint aches, no rectal pain or bleeding, no night sweats    Vital Signs Last 24 Hrs  T(C): 36.9, Max: 36.9 ( @ 01:44)  T(F): 98.5, Max: 98.5 ( @ 01:44)  HR: 73 (73 - 86)  BP: 150/75 (137/86 - 150/75)  BP(mean): --  RR: 15 (15 - 18)  SpO2: 99% (99% - 100%)  Daily Height in cm: 170.18 (28 May 2017 01:44)    Daily     PE:    Constitutional: frail looking  HEENT: NC/AT, EOMI, PERRLA  Neck: supple  Back: no tenderness  Respiratory: clear  Cardiovascular: S1S2 regular, no murmurs  Abdomen: soft, not tender, not distended, positive BS  Genitourinary: right flank tenderness  Rectal: deferred  Musculoskeletal: no muscle tenderness, no joint swelling or tenderness  Extremities: no pedal edema  Neurological: AxOx3, moving all extremities, no focal deficits  Skin: no rashes    Labs:                        13.5   14.5  )-----------( 343      ( 28 May 2017 02:13 )             39.9         136  |  103  |  16  ----------------------------<  222<H>  4.2   |  24  |  1.29    Ca    9.1      28 May 2017 02:13    TPro  8.6<H>  /  Alb  4.0  /  TBili  0.6  /  DBili  x   /  AST  24  /  ALT  38  /  AlkPhos  82       LIVER FUNCTIONS - ( 28 May 2017 02:13 )  Alb: 4.0 g/dL / Pro: 8.6 gm/dL / ALK PHOS: 82 U/L / ALT: 38 U/L / AST: 24 U/L / GGT: x           Urinalysis Basic - ( 28 May 2017 02:13 )    Color: Yellow / Appearance: Clear / S.020 / pH: x  Gluc: x / Ketone: Moderate  / Bili: Negative / Urobili: Negative mg/dL   Blood: x / Protein: 30 mg/dL / Nitrite: Negative   Leuk Esterase: Trace / RBC: 3-5 /HPF / WBC 3-5   Sq Epi: x / Non Sq Epi: Occasional / Bacteria: Occasional          Radiology:     CT abdomen and pelvis:  Mild right hydroureteronephrosis secondary to a distal right ureter   calculus measuring 0.3 cm just proximal to the right ureterovesicular   junction. Minimal right renal edema     Advanced directives addressed: full resuscitation Patient is a 57y old  Female who presents with a chief complaint of pain and nausea (28 May 2017 14:57)    HPI:  58 y/o Female with h/o borderline DM type 2, recent right UVJ kidney stone and UTI was admitted on  for recurrent right flank pain. She was seen in ER on 17 and was dx with 3 mm right UVJ stone with right hydroureteronephrosis and UTI and was d/pushpa on cipro PO. SHe was uncompliant with taking the cipro due to N/V. She came back sec to increasing right flank pain, n/v. Denies any fever.       PMH: as above  PSH: as above  Meds: per reconciliation sheet, noted below  MEDICATIONS  (STANDING):  enoxaparin Injectable 40milliGRAM(s) SubCutaneous every 24 hours  sodium chloride 0.9%. 1000milliLiter(s) IV Continuous <Continuous>  docusate sodium 100milliGRAM(s) Oral three times a day  aztreonam  IVPB  IV Intermittent   aztreonam  IVPB 1000milliGRAM(s) IV Intermittent once  aztreonam  IVPB 1000milliGRAM(s) IV Intermittent every 8 hours  insulin lispro (HumaLOG) corrective regimen sliding scale  SubCutaneous three times a day before meals  dextrose 5%. 1000milliLiter(s) IV Continuous <Continuous>  dextrose 50% Injectable 12.5Gram(s) IV Push once  dextrose 50% Injectable 25Gram(s) IV Push once  dextrose 50% Injectable 25Gram(s) IV Push once    MEDICATIONS  (PRN):  acetaminophen   Tablet. 650milliGRAM(s) Oral every 6 hours PRN Mild Pain (1 - 3)  ondansetron Injectable 4milliGRAM(s) IV Push every 6 hours PRN Nausea  senna 2Tablet(s) Oral at bedtime PRN Constipation  HYDROmorphone  Injectable 1milliGRAM(s) IV Push every 4 hours PRN Moderate Pain (4 - 6)  dextrose Gel 1Dose(s) Oral once PRN Blood Glucose LESS THAN 70 milliGRAM(s)/deciliter  glucagon  Injectable 1milliGRAM(s) IntraMuscular once PRN Glucose LESS THAN 70 milligrams/deciliter    Allergies    penicillins: rash 9 years ago    Intolerances      Social: no smoking, no alcohol, no illegal drugs; no recent travel, no exposure to TB  FAMILY HISTORY:  No pertinent family history in first degree relatives    ROS: the patient denies fever, no chills, no HA, no dizziness, no sore throat, no blurry vision, no CP, no palpitations, no SOB, no cough, no abdominal pain, has right flank pain, no diarrhea, had N/V, no dysuria, no leg pain, no claudication, no rash, no joint aches, no rectal pain or bleeding, no night sweats    Vital Signs Last 24 Hrs  T(C): 36.9, Max: 36.9 ( @ 01:44)  T(F): 98.5, Max: 98.5 ( @ 01:44)  HR: 73 (73 - 86)  BP: 150/75 (137/86 - 150/75)  BP(mean): --  RR: 15 (15 - 18)  SpO2: 99% (99% - 100%)  Daily Height in cm: 170.18 (28 May 2017 01:44)    Daily     PE:    Constitutional: frail looking  HEENT: NC/AT, EOMI, PERRLA  Neck: supple  Back: no tenderness  Respiratory: clear  Cardiovascular: S1S2 regular, no murmurs  Abdomen: soft, not tender, not distended, positive BS  Genitourinary: right flank tenderness  Rectal: deferred  Musculoskeletal: no muscle tenderness, no joint swelling or tenderness  Extremities: no pedal edema  Neurological: AxOx3, moving all extremities, no focal deficits  Skin: no rashes    Labs:                        13.5   14.5  )-----------( 343      ( 28 May 2017 02:13 )             39.9         136  |  103  |  16  ----------------------------<  222<H>  4.2   |  24  |  1.29    Ca    9.1      28 May 2017 02:13    TPro  8.6<H>  /  Alb  4.0  /  TBili  0.6  /  DBili  x   /  AST  24  /  ALT  38  /  AlkPhos  82       LIVER FUNCTIONS - ( 28 May 2017 02:13 )  Alb: 4.0 g/dL / Pro: 8.6 gm/dL / ALK PHOS: 82 U/L / ALT: 38 U/L / AST: 24 U/L / GGT: x           Urinalysis Basic - ( 28 May 2017 02:13 )    Color: Yellow / Appearance: Clear / S.020 / pH: x  Gluc: x / Ketone: Moderate  / Bili: Negative / Urobili: Negative mg/dL   Blood: x / Protein: 30 mg/dL / Nitrite: Negative   Leuk Esterase: Trace / RBC: 3-5 /HPF / WBC 3-5   Sq Epi: x / Non Sq Epi: Occasional / Bacteria: Occasional          Radiology:     CT abdomen and pelvis:  Mild right hydroureteronephrosis secondary to a distal right ureter   calculus measuring 0.3 cm just proximal to the right ureterovesicular   junction. Minimal right renal edema     Advanced directives addressed: full resuscitation

## 2017-05-28 NOTE — ED ADULT TRIAGE NOTE - CHIEF COMPLAINT QUOTE
right sided flank pain. Seen in ED tuesday and diagnosed with "kidney stones." Reports worsening pain with vomiting this evening.

## 2017-05-28 NOTE — ED PROVIDER NOTE - OBJECTIVE STATEMENT
58 y/o F dx with kidney and uti on 5/23 and given oxycodone, zofran and cipro presents to the ED c/o increased right flank pain radiating to groin and intractable vomiting. Pt states she was feeling slightly better until this past 1 day when she had increased right flank pain. Pt last took oxycodone 1 day ago and zofran at 6 pm. Currently pt has no other complaints and denies fever.

## 2017-05-28 NOTE — ED PROVIDER NOTE - PROGRESS NOTE DETAILS
pt was feeling better, fell asleep, woke up nauseated and vomited x 1 Pt tried po but vomited.  Pt will require admission.

## 2017-05-28 NOTE — H&P ADULT - NSHPPHYSICALEXAM_GEN_ALL_CORE
PHYSICAL EXAM:    Daily Height in cm: 170.18 (28 May 2017 01:44)    Daily     ICU Vital Signs Last 24 Hrs  T(C): 36.8, Max: 36.9 (05-28 @ 01:44)  T(F): 98.3, Max: 98.5 (05-28 @ 01:44)  HR: 83 (83 - 86)  BP: 140/85 (137/86 - 140/85)  BP(mean): --  ABP: --  ABP(mean): --  RR: 17 (17 - 18)  SpO2: 99% (99% - 100%)      Constitutional: Well appearing  HEENT: Atraumatic, ALEJANDRO, Normal, No congestion  Respiratory: Breath Sounds normal, no rhonchi/wheeze  Cardiovascular: N S1S2; JEANNETTE present  Gastrointestinal: Abdomen soft, non tender, Bowel Sounds present  Extremities: No edema, peripheral pulses present  Neurological: AAO x 3, no gross focal motor deficits  Skin: Non cellulitic, no rash, ulcers  Lymph Nodes: No lymphadenopathy noted  Back: Right CVA tenderness   Musculoskeletal: non tender  Breasts: Deferred  Genitourinary: deferred  Rectal: Deferred

## 2017-05-28 NOTE — H&P ADULT - NSHPLABSRESULTS_GEN_ALL_CORE
13.5   14.5  )-----------( 343      ( 28 May 2017 02:13 )             39.9       CBC Full  -  ( 28 May 2017 02:13 )  WBC Count : 14.5 K/uL  Hemoglobin : 13.5 g/dL  Hematocrit : 39.9 %  Platelet Count - Automated : 343 K/uL  Mean Cell Volume : 80.3 fl  Mean Cell Hemoglobin : 27.3 pg  Mean Cell Hemoglobin Concentration : 34.0 gm/dL  Auto Neutrophil # : 11.6 K/uL  Auto Lymphocyte # : 2.1 K/uL  Auto Monocyte # : 0.7 K/uL  Auto Eosinophil # : 0.0 K/uL  Auto Basophil # : 0.1 K/uL  Auto Neutrophil % : 80.3 %  Auto Lymphocyte % : 14.3 %  Auto Monocyte % : 4.8 %  Auto Eosinophil % : 0.2 %  Auto Basophil % : 0.4 %          136  |  103  |  16  ----------------------------<  222<H>  4.2   |  24  |  1.29    Ca    9.1      28 May 2017 02:13    TPro  8.6<H>  /  Alb  4.0  /  TBili  0.6  /  DBili  x   /  AST  24  /  ALT  38  /  AlkPhos  82        LIVER FUNCTIONS - ( 28 May 2017 02:13 )  Alb: 4.0 g/dL / Pro: 8.6 gm/dL / ALK PHOS: 82 U/L / ALT: 38 U/L / AST: 24 U/L / GGT: x                       Urinalysis Basic - ( 28 May 2017 02:13 )    Color: Yellow / Appearance: Clear / S.020 / pH: x  Gluc: x / Ketone: Moderate  / Bili: Negative / Urobili: Negative mg/dL   Blood: x / Protein: 30 mg/dL / Nitrite: Negative   Leuk Esterase: Trace / RBC: 3-5 /HPF / WBC 3-5   Sq Epi: x / Non Sq Epi: Occasional / Bacteria: Occasional    EXAM:  CT ABDOMEN AND PELVIS IC                        PROCEDURE DATE:  2017      IMPRESSION:    Mild right hydroureteronephrosis secondary to a distal right ureter   calculus measuring 0.3 cm just proximal to the right ureterovesicular   junction. Minimal right renal edema     MEDICATIONS  (STANDING):

## 2017-05-28 NOTE — ED ADULT NURSE NOTE - OBJECTIVE STATEMENT
Patient arrived to ED c/o right flank pain. Recently diagnosed with kidney stones, returns to ED with c/o worsened pain and nausea and vomiting. Denies CP, fever, chills.

## 2017-05-28 NOTE — ED PROVIDER NOTE - NS ED MD SCRIBE ATTENDING SCRIBE SECTIONS
PAST MEDICAL/SURGICAL/SOCIAL HISTORY/RESULTS/REVIEW OF SYSTEMS/PHYSICAL EXAM/DISPOSITION/PROGRESS NOTE/HISTORY OF PRESENT ILLNESS

## 2017-05-28 NOTE — H&P ADULT - HISTORY OF PRESENT ILLNESS
57/F with PMHx of borderline DM 2, came to ER on 5/23/17 and was dx with 3 mm right UVJ stone with right hydroureteronephrosis and uti and was d/pushpa on cipro which has not taken much sec to n/v.   She came back sec to increasing right flank pain, n/v. Denies any fever. 57/F with PMHx of borderline DM 2 Mx with diet and exercise, came to ER on 5/23/17 and was dx with 3 mm right UVJ stone with right hydroureteronephrosis and uti and was d/pushpa on cipro which has not taken much sec to n/v.   She came back sec to increasing right flank pain, n/v. Denies any fever.

## 2017-05-28 NOTE — ED ADULT NURSE REASSESSMENT NOTE - NS ED NURSE REASSESS COMMENT FT1
Assisted with toileting, medicated as ordered. VSS. Will continue monitoring patient.
pt received awake, alert and oriented x3. no distress at this time. pt ambulated to bathroom w/o assistance and denies pain. pt voices no needs or concerns at this time. pending dispo. will cont to monitor.

## 2017-05-28 NOTE — ED PROVIDER NOTE - MEDICAL DECISION MAKING DETAILS
56 yo female with known kidney stone with intractable vomiting despite zofran, unable to tolerate antibiotics, will recheck labs/urine, hydrate, medicate and reasses

## 2017-05-28 NOTE — H&P ADULT - ASSESSMENT
57/F admitted with     1) Right falnk PAin + Nausea/vomiting sec to 3 mm right renal stone at UVJ with Hydroeteronephrosis + likely right Pyelonephritis + dehydration + leukocytosis:  admit to med floo  hemodynamically stable  iv fluids  iv pain meds  Azactam 1 g iv q 8 hrs  ID consult  Urology consult  repeat labs in am  renal sono to check if stone has passed or not and f/u on hyroureteronephrosis  f/u urine/bood cx    poc discussed with pt, team. 57/F admitted with     1) Right falnk PAin + Nausea/vomiting sec to 3 mm right renal stone at UVJ with Hydroeteronephrosis + likely right Pyelonephritis + dehydration + leukocytosis:  admit to med floo  hemodynamically stable  iv fluids  iv pain meds  Azactam 1 g iv q 8 hrs  ID consult  Urology consult  repeat labs in am  renal sono to check if stone has passed or not and f/u on hyroureteronephrosis  f/u urine/bood cx    2) DM 2:  ISS  check HbA1c  poc discussed with pt, team.

## 2017-05-28 NOTE — ED PROVIDER NOTE - CARE PLAN
Principal Discharge DX:	Nausea & vomiting Principal Discharge DX:	Nausea & vomiting  Secondary Diagnosis:	Kidney stone

## 2017-05-28 NOTE — CONSULT NOTE ADULT - ASSESSMENT
56 y/o Female with h/o borderline DM type 2, recent right UVJ kidney stone and UTI was admitted on 5/28 for recurrent right flank pain. She was seen in ER on 5/23/17 and was dx with 3 mm right UVJ stone with right hydroureteronephrosis and UTI and was d/pushpa on cipro PO. SHe was uncompliant with taking the cipro due to N/V. She came back sec to increasing right flank pain, n/v. Denies any fever.     1. Right flank pain. Right side kidney stone. Mild pyuria. Possible UTI ?partially treated. Allergy to PCN.  -?pain due to persistent stone  -she was started on aztreonam in ER 56 y/o Female with h/o borderline DM type 2, recent right UVJ kidney stone and UTI was admitted on 5/28 for recurrent right flank pain. She was seen in ER on 5/23/17 and was dx with 3 mm right UVJ stone with right hydroureteronephrosis and UTI and was d/pushpa on cipro PO. SHe was uncompliant with taking the cipro due to N/V. She came back sec to increasing right flank pain, n/v. Denies any fever.     1. Right flank pain. Right side kidney stone. Mild pyuria. Possible UTI ?partially treated. Allergy to PCN.  -leukocytosis  -?pain due to persistent stone  -she was started on aztreonam in ER  -repeat urine c/s, BC x 2  -abx choice d/w patient  -change abx to ceftriaxone 1 gm IV qd  -reason for abx use and side effects reviewed with patient; monitor BMP   -monitor closely in nancy of PCN allergy history  -IV hydration  -monitor temps  -f/u CBC  -supportive care  2. Other issues:   -care per medicine

## 2017-05-29 ENCOUNTER — RESULT REVIEW (OUTPATIENT)
Age: 57
End: 2017-05-29

## 2017-05-29 DIAGNOSIS — N10 ACUTE PYELONEPHRITIS: ICD-10-CM

## 2017-05-29 DIAGNOSIS — N17.9 ACUTE KIDNEY FAILURE, UNSPECIFIED: ICD-10-CM

## 2017-05-29 DIAGNOSIS — E11.9 TYPE 2 DIABETES MELLITUS WITHOUT COMPLICATIONS: ICD-10-CM

## 2017-05-29 DIAGNOSIS — N20.0 CALCULUS OF KIDNEY: ICD-10-CM

## 2017-05-29 LAB
ANION GAP SERPL CALC-SCNC: 7 MMOL/L — SIGNIFICANT CHANGE UP (ref 5–17)
BUN SERPL-MCNC: 11 MG/DL — SIGNIFICANT CHANGE UP (ref 7–23)
CALCIUM SERPL-MCNC: 8.1 MG/DL — LOW (ref 8.5–10.1)
CHLORIDE SERPL-SCNC: 108 MMOL/L — SIGNIFICANT CHANGE UP (ref 96–108)
CO2 SERPL-SCNC: 26 MMOL/L — SIGNIFICANT CHANGE UP (ref 22–31)
CREAT SERPL-MCNC: 1.15 MG/DL — SIGNIFICANT CHANGE UP (ref 0.5–1.3)
GLUCOSE SERPL-MCNC: 108 MG/DL — HIGH (ref 70–99)
HBA1C BLD-MCNC: 7.6 % — HIGH (ref 4–5.6)
HCT VFR BLD CALC: 33.8 % — LOW (ref 34.5–45)
HGB BLD-MCNC: 11 G/DL — LOW (ref 11.5–15.5)
MCHC RBC-ENTMCNC: 27 PG — SIGNIFICANT CHANGE UP (ref 27–34)
MCHC RBC-ENTMCNC: 32.7 GM/DL — SIGNIFICANT CHANGE UP (ref 32–36)
MCV RBC AUTO: 82.5 FL — SIGNIFICANT CHANGE UP (ref 80–100)
PLATELET # BLD AUTO: 272 K/UL — SIGNIFICANT CHANGE UP (ref 150–400)
POTASSIUM SERPL-MCNC: 4 MMOL/L — SIGNIFICANT CHANGE UP (ref 3.5–5.3)
POTASSIUM SERPL-SCNC: 4 MMOL/L — SIGNIFICANT CHANGE UP (ref 3.5–5.3)
RBC # BLD: 4.09 M/UL — SIGNIFICANT CHANGE UP (ref 3.8–5.2)
RBC # FLD: 12.4 % — SIGNIFICANT CHANGE UP (ref 10.3–14.5)
SODIUM SERPL-SCNC: 141 MMOL/L — SIGNIFICANT CHANGE UP (ref 135–145)
WBC # BLD: 12.4 K/UL — HIGH (ref 3.8–10.5)
WBC # FLD AUTO: 12.4 K/UL — HIGH (ref 3.8–10.5)

## 2017-05-29 PROCEDURE — 88300 SURGICAL PATH GROSS: CPT | Mod: 26

## 2017-05-29 RX ORDER — OXYCODONE HYDROCHLORIDE 5 MG/1
5 TABLET ORAL EVERY 6 HOURS
Qty: 0 | Refills: 0 | Status: DISCONTINUED | OUTPATIENT
Start: 2017-05-29 | End: 2017-05-30

## 2017-05-29 RX ADMIN — CEFTRIAXONE 100 GRAM(S): 500 INJECTION, POWDER, FOR SOLUTION INTRAMUSCULAR; INTRAVENOUS at 17:45

## 2017-05-29 RX ADMIN — Medication 100 MILLIGRAM(S): at 21:04

## 2017-05-29 RX ADMIN — ENOXAPARIN SODIUM 40 MILLIGRAM(S): 100 INJECTION SUBCUTANEOUS at 17:46

## 2017-05-29 RX ADMIN — Medication 100 MILLIGRAM(S): at 14:30

## 2017-05-29 RX ADMIN — HYDROMORPHONE HYDROCHLORIDE 1 MILLIGRAM(S): 2 INJECTION INTRAMUSCULAR; INTRAVENOUS; SUBCUTANEOUS at 02:37

## 2017-05-29 RX ADMIN — SODIUM CHLORIDE 125 MILLILITER(S): 9 INJECTION INTRAMUSCULAR; INTRAVENOUS; SUBCUTANEOUS at 06:36

## 2017-05-29 RX ADMIN — Medication 100 MILLIGRAM(S): at 06:37

## 2017-05-29 NOTE — PROGRESS NOTE ADULT - PROBLEM SELECTOR PLAN 1
No gu intervention at this time. Pt may follow up in office as outpatient. Will follow up stone analysis and address additional 3 mm stone in kidney.
med-surg floor  IV fluids  s/p IV Azactam 1 g iv q 8 hrs, now on  IV ceftriaxone  urine cx - neg - likely partially treated UTI  but clinically pyelonephritis with mild right hydroureteronephrosis  ID and urology consult

## 2017-05-30 VITALS
DIASTOLIC BLOOD PRESSURE: 72 MMHG | TEMPERATURE: 98 F | OXYGEN SATURATION: 98 % | SYSTOLIC BLOOD PRESSURE: 132 MMHG | HEART RATE: 80 BPM

## 2017-05-30 LAB
ANION GAP SERPL CALC-SCNC: 8 MMOL/L — SIGNIFICANT CHANGE UP (ref 5–17)
BUN SERPL-MCNC: 14 MG/DL — SIGNIFICANT CHANGE UP (ref 7–23)
CALCIUM SERPL-MCNC: 8.9 MG/DL — SIGNIFICANT CHANGE UP (ref 8.5–10.1)
CHLORIDE SERPL-SCNC: 106 MMOL/L — SIGNIFICANT CHANGE UP (ref 96–108)
CO2 SERPL-SCNC: 28 MMOL/L — SIGNIFICANT CHANGE UP (ref 22–31)
CREAT SERPL-MCNC: 0.88 MG/DL — SIGNIFICANT CHANGE UP (ref 0.5–1.3)
GLUCOSE SERPL-MCNC: 129 MG/DL — HIGH (ref 70–99)
HCT VFR BLD CALC: 39.6 % — SIGNIFICANT CHANGE UP (ref 34.5–45)
HGB BLD-MCNC: 12.9 G/DL — SIGNIFICANT CHANGE UP (ref 11.5–15.5)
MCHC RBC-ENTMCNC: 27 PG — SIGNIFICANT CHANGE UP (ref 27–34)
MCHC RBC-ENTMCNC: 32.7 GM/DL — SIGNIFICANT CHANGE UP (ref 32–36)
MCV RBC AUTO: 82.5 FL — SIGNIFICANT CHANGE UP (ref 80–100)
PLATELET # BLD AUTO: 323 K/UL — SIGNIFICANT CHANGE UP (ref 150–400)
POTASSIUM SERPL-MCNC: 3.5 MMOL/L — SIGNIFICANT CHANGE UP (ref 3.5–5.3)
POTASSIUM SERPL-SCNC: 3.5 MMOL/L — SIGNIFICANT CHANGE UP (ref 3.5–5.3)
RBC # BLD: 4.79 M/UL — SIGNIFICANT CHANGE UP (ref 3.8–5.2)
RBC # FLD: 12.2 % — SIGNIFICANT CHANGE UP (ref 10.3–14.5)
SODIUM SERPL-SCNC: 142 MMOL/L — SIGNIFICANT CHANGE UP (ref 135–145)
SURGICAL PATHOLOGY FINAL REPORT - CH: SIGNIFICANT CHANGE UP
WBC # BLD: 10.1 K/UL — SIGNIFICANT CHANGE UP (ref 3.8–10.5)
WBC # FLD AUTO: 10.1 K/UL — SIGNIFICANT CHANGE UP (ref 3.8–10.5)

## 2017-05-30 PROCEDURE — 76770 US EXAM ABDO BACK WALL COMP: CPT | Mod: 26

## 2017-05-30 RX ORDER — ACETAMINOPHEN 500 MG
2 TABLET ORAL
Qty: 0 | Refills: 0 | COMMUNITY
Start: 2017-05-30

## 2017-05-30 RX ORDER — CEFOXITIN 1 G/1
1 INJECTION, POWDER, FOR SOLUTION INTRAVENOUS
Qty: 14 | Refills: 0 | OUTPATIENT
Start: 2017-05-30 | End: 2017-06-06

## 2017-05-30 RX ADMIN — Medication 100 MILLIGRAM(S): at 05:24

## 2017-05-30 RX ADMIN — SODIUM CHLORIDE 125 MILLILITER(S): 9 INJECTION INTRAMUSCULAR; INTRAVENOUS; SUBCUTANEOUS at 06:44

## 2017-05-30 NOTE — DISCHARGE NOTE ADULT - SECONDARY DIAGNOSIS.
Kidney stone Type 2 diabetes mellitus without complication, without long-term current use of insulin CARMEN (acute kidney injury)

## 2017-05-30 NOTE — DISCHARGE NOTE ADULT - OTHER SIGNIFICANT FINDINGS
Complete Blood Count in AM (05.30.17 @ 07:32)    WBC Count: 10.1 K/uL    RBC Count: 4.79 M/uL    Hemoglobin: 12.9 g/dL    Hematocrit: 39.6 %    Mean Cell Volume: 82.5 fl    Mean Cell Hemoglobin: 27.0 pg    Mean Cell Hemoglobin Conc: 32.7 gm/dL    Red Cell Distrib Width: 12.2 %    Platelet Count - Automated: 323 K/uL    Basic Metabolic Panel in AM (05.30.17 @ 07:32)    Sodium, Serum: 142 mmol/L    Potassium, Serum: 3.5 mmol/L    Chloride, Serum: 106 mmol/L    Carbon Dioxide, Serum: 28 mmol/L    Anion Gap, Serum: 8 mmol/L    Blood Urea Nitrogen, Serum: 14 mg/dL    Creatinine, Serum: 0.88 mg/dL    Glucose, Serum: 129 mg/dL    Calcium, Total Serum: 8.9 mg/dL      Hemoglobin A1C, Whole Blood (05.29.17 @ 06:34)    Hemoglobin A1C, Whole Blood: 7.6: Method: Immunoassay       Reference Range                4.0-5.6%       High risk (prediabetic)        5.7-6.4%       Diabetic, diagnostic             >=6.5%       ADA diabetic treatment goal       <7.0%  The Hemoglobin A1c reference ranges are based7.6: on the 2010 recommendations  of  The American Diabetes Association.  Interpretation may vary for children  and  adolescent %    Culture - Urine (05.23.17 @ 20:00)    Specimen Source: .Urine Clean Catch (Midstream)    Culture Results:   <10,000 CFU/ml  Normal Urogenital kari present    US KIDNEYS AND BLADDER   5/30/2017    Right kidney:  11.5 cm. No renal mass, hydronephrosis or calculus.    Normal cortical echogenicity. 4 mm lower pole nonobstructing stone.    Left kidney:  10.7 cm. No renal mass, hydronephrosis or calculus.  Normal   cortical echogenicity.    Urinary bladder: Underdistended, limiting evaluation.    Other: Aorta is normal in caliber in its visualized portion. Fatty liver.    IMPRESSION:     4 mm nonobstructing right renal stone.    CT ABDOMEN AND PELVIS IC          05/23/2017    IMPRESSION:    Mild right hydroureteronephrosis secondary to a distal right ureter   calculus measuring 0.3 cm just proximal to the right ureterovesicular   junction. Minimal right renal edema

## 2017-05-30 NOTE — DISCHARGE NOTE ADULT - ADDITIONAL INSTRUCTIONS
follow up with PCP and urology within 1 week  complete antibiotics, return to ED if fever, abdominal pain, nausea, vomiting, chest pain, dyspnea, other concerns

## 2017-05-30 NOTE — DISCHARGE NOTE ADULT - HOSPITAL COURSE
57/F with PMHx of DM2 controlled with diet and exercise, came to ER on 5/23/17 and was dx with 3 mm right UVJ stone with right hydroureteronephrosis and uti and was d/pushpa on cipro which has not taken much sec to n/v.  She came back on 5/28 sec to increasing right flank pain, n/v. Denies any fever.     5/29 - Patient seen and examined at bedside earlier today, patient passed stones earlier today , reports improvement of right flank pain, no nausea, no vomiting  5/30 afebrile, denies n/v/d, minimal R flank pain, denies new symtoms    Review of system- Rest of the review of system are negative except mentioned in HPI    Vital Signs Last 24 Hrs  T(C): 36.8, Max: 37.1 (05-29 @ 20:36)  T(F): 98.2, Max: 98.7 (05-29 @ 20:36)  HR: 80 (80 - 90)  BP: 132/72 (101/49 - 132/72)  BP(mean): --  RR: --  SpO2: 98% (97% - 99%)    PHYSICAL EXAM:  GENERAL: NAD  NERVOUS SYSTEM:  Alert & Oriented X3, non- focal exam, Motor Strength 5/5 B/L upper and lower extremities; DTRs 2+ intact and symmetric  HEAD:  Atraumatic, Normocephalic  EYES: EOMI, PERRLA, conjunctiva and sclera clear  HEENT: Moist mucous membranes  NECK: Supple, No JVD  CHEST/LUNG: Clear to auscultation bilaterally; No rales, no rhonchi, no wheezing, or rubs  HEART: Regular rate and rhythm; No murmurs, rubs, or gallops  ABDOMEN: Soft, Nontender, Nondistended; Bowel sounds present  GENITOURINARY- Voiding, no suprapubic tenderness  EXTREMITIES:  2+ Peripheral Pulses, No clubbing, cyanosis, or edema  MUSCULOSKELETAL:- No muscle tenderness, Muscle tone normal, No joint tenderness, no Joint swelling, Joint range of motion-normal  SKIN-no rash, no lesion      57/F with PMHx of DM2 controlled with diet and exercise, came to ER on 5/23/17 and was dx with 3 mm right UVJ stone with right hydroureteronephrosis and uti and was d/pushpa on cipro which has not taken much sec to n/v.  She came back on 5/28 sec to increasing right flank pain, n/v.     Problem/Plan - 1:  ·  Problem: Pyelonephritis, acute.  Plan: med-surg floor  IV fluids  s/p IV Azactam 1 g iv q 8 hrs, now on  IV ceftriaxone  urine cx - neg - likely partially treated UTI  but clinically pyelonephritis with mild right hydroureteronephrosis  ID and urology consult.     Problem/Plan - 2:  ·  Problem: Nephrolithiasis.  Plan: 3 mm right renal stone at UVJ   kidney stone send for analysis.     Problem/Plan - 3:  ·  Problem: CARMEN (acute kidney injury).  Plan: IV hydration  monitor daily.     Problem/Plan - 4:  ·  Problem: Type 2 diabetes mellitus without complication, without long-term current use of insulin.  Plan: A1C 7.6.     Attending Attestation:   Dispo - IV fluids, IV abx.     Disposition - medically optimized to be discharged home with close follow up with PCP and urology within 1 week  complete antibiotics  return to ED if fever, abdominal pain, nausea, vomiting, chest pain, dyspnea, other concerns  Discharge plan discussed with patient, RN  Patient advised to follow up with PCP within 3-7 days  time spend 40 min

## 2017-05-30 NOTE — DISCHARGE NOTE ADULT - CARE PROVIDER_API CALL
Margaux Miranda), Family Medicine  [address] [place] [address] [address]  [place] [address]  Phone: [phone]  Fax: [phone]    Chau Vallecillo), Urology  [address] [place] [address]  Phone: [phone]  Fax: [phone]

## 2017-05-30 NOTE — DISCHARGE NOTE ADULT - PATIENT PORTAL LINK FT
“You can access the [facility] Patient Portal, offered by [facility], by registering with the following website: [email or web address]”

## 2017-05-30 NOTE — DISCHARGE NOTE ADULT - CARE PLAN
Principal Discharge DX:	Pyelonephritis, acute  Goal:	resolve  Instructions for follow-up, activity and diet:	complete antibiotics, follow up with PCP within in 1 week  Secondary Diagnosis:	Kidney stone  Instructions for follow-up, activity and diet:	4 mm non obstructing right renal stone. on sono from 5/30/17  follow up with Dr. Vallecillo for kidney stone analysis and further management in 1 week  Secondary Diagnosis:	Type 2 diabetes mellitus without complication, without long-term current use of insulin  Instructions for follow-up, activity and diet:	diet, exercise, A1C 7.6, follow up with PCP for management , consider starting metformin  Secondary Diagnosis:	CARMEN (acute kidney injury)  Instructions for follow-up, activity and diet:	drink plenty of fluids

## 2017-05-30 NOTE — PROGRESS NOTE ADULT - ASSESSMENT
57/F with PMHx of DM2 controlled with diet and exercise, came to ER on 5/23/17 and was dx with 3 mm right UVJ stone with right hydroureteronephrosis and uti and was d/pushpa on cipro which has not taken much sec to n/v.  She came back on 5/28 sec to increasing right flank pain, n/v.
58 y/o Female with h/o borderline DM type 2, recent right UVJ kidney stone and UTI was admitted on 5/28 for recurrent right flank pain. She was seen in ER on 5/23/17 and was dx with 3 mm right UVJ stone with right hydroureteronephrosis and UTI and was d/pushpa on cipro PO. SHe was uncompliant with taking the cipro due to N/V. She came back sec to increasing right flank pain, n/v. Denies any fever.     1. Right flank pain improving. Right side kidney stone. Mild pyuria. Possible UTI ?partially treated. Allergy to PCN.  -leukocytosis improving  -pain likely due to passing stone  -f/u repeat urine c/s, BC x 2  -abx choice d/w patient  -on ceftriaxone 1 gm IV qd # 2  -tolerating abx well so far; no side effects noted   -monitor closely in nancy of PCN allergy history  -continue abx coverage for now  -IV hydration  -monitor temps  -f/u CBC  -supportive care  2. Other issues:   -care per medicine
58 y/o Female with h/o borderline DM type 2, recent right UVJ kidney stone and UTI was admitted on 5/28 for recurrent right flank pain. She was seen in ER on 5/23/17 and was dx with 3 mm right UVJ stone with right hydroureteronephrosis and UTI and was d/pushpa on cipro PO. SHe was uncompliant with taking the cipro due to N/V. She came back sec to increasing right flank pain, n/v. Denies any fever.     1. Right flank pain resolved. Right side kidney stone. Mild pyuria. Possible UTI ?partially treated. Allergy to PCN.  -leukocytosis resolved  -pain resolved  -urine c/s shows minimal bacteria  -BC x 2 are negative  -on ceftriaxone 1 gm IV qd # 3  -tolerating abx well so far; no side effects noted   -change abx to ceftin 500 mg PO q12h for 7 more days  -supportive care  2. Other issues:   -care per medicine

## 2017-05-30 NOTE — DISCHARGE NOTE ADULT - MEDICATION SUMMARY - MEDICATIONS TO TAKE
I will START or STAY ON the medications listed below when I get home from the hospital:    acetaminophen 325 mg oral tablet  -- 2 tab(s) by mouth every 6 hours, As needed, Mild Pain (1 - 3)  -- Indication: For Pain    Ceftin 500 mg oral tablet  -- 1 tab(s) by mouth 2 times a day  -- Finish all this medication unless otherwise directed by prescriber.  Medication should be taken with plenty of water.  Take with food or milk.    -- Indication: For Pyelonephritis, acute

## 2017-05-30 NOTE — PROGRESS NOTE ADULT - SUBJECTIVE AND OBJECTIVE BOX
CHIEF COMPLAINT: ureteral stone    HISTORY OF PRESENT ILLNESS: Pt has passed a distal right ureteral stone. She feels much better with only lingering right lower quadrant discomfort. The stone is retrieved and sent for analysis. There are no prior history of stones but has brother and nephew with history of spontaneous passage of stones. There is an additional 3 mm stone in the kidney.    PAST MEDICAL & SURGICAL HISTORY:  Type 2 diabetes mellitus without complication, without long-term current use of insulin  No pertinent past medical history  S/P knee replacement  S/P  section      REVIEW OF SYSTEMS:Same previous  MEDICATIONS  (STANDING):  enoxaparin Injectable 40milliGRAM(s) SubCutaneous every 24 hours  sodium chloride 0.9%. 1000milliLiter(s) IV Continuous <Continuous>  docusate sodium 100milliGRAM(s) Oral three times a day  insulin lispro (HumaLOG) corrective regimen sliding scale  SubCutaneous three times a day before meals  dextrose 5%. 1000milliLiter(s) IV Continuous <Continuous>  dextrose 50% Injectable 12.5Gram(s) IV Push once  dextrose 50% Injectable 25Gram(s) IV Push once  dextrose 50% Injectable 25Gram(s) IV Push once  cefTRIAXone   IVPB 1Gram(s) IV Intermittent every 24 hours  cefTRIAXone   IVPB  IV Intermittent     MEDICATIONS  (PRN):  acetaminophen   Tablet. 650milliGRAM(s) Oral every 6 hours PRN Mild Pain (1 - 3)  ondansetron Injectable 4milliGRAM(s) IV Push every 6 hours PRN Nausea  senna 2Tablet(s) Oral at bedtime PRN Constipation  HYDROmorphone  Injectable 1milliGRAM(s) IV Push every 4 hours PRN Moderate Pain (4 - 6)  dextrose Gel 1Dose(s) Oral once PRN Blood Glucose LESS THAN 70 milliGRAM(s)/deciliter  glucagon  Injectable 1milliGRAM(s) IntraMuscular once PRN Glucose LESS THAN 70 milligrams/deciliter  oxyCODONE IR 5milliGRAM(s) Oral every 6 hours PRN Moderate Pain (4 - 6)      PE: No CVAT    Allergies    penicillins (Hives)    Intolerances        SOCIAL HISTORY:Same previous    FAMILY HISTORY:  No pertinent family history in first degree relatives      Vital Signs Last 24 Hrs  T(C): 36.9, Max: 37 ( @ 21:35)  T(F): 98.4, Max: 98.6 ( @ 21:35)  HR: 103 (88 - 103)  BP: 120/63 (115/67 - 120/63)  BP(mean): --  RR: 17 (16 - 17)  SpO2: 97% (97% - 100%)    PHYSICAL EXAM:Same previous    LABS:                        11.0   12.4  )-----------( 272      ( 29 May 2017 06:34 )             33.8         141  |  108  |  11  ----------------------------<  108<H>  4.0   |  26  |  1.15    Ca    8.1<L>      29 May 2017 06:34    TPro  8.6<H>  /  Alb  4.0  /  TBili  0.6  /  DBili  x   /  AST  24  /  ALT  38  /  AlkPhos  82        Urinalysis Basic - ( 28 May 2017 02:13 )    Color: Yellow / Appearance: Clear / S.020 / pH: x  Gluc: x / Ketone: Moderate  / Bili: Negative / Urobili: Negative mg/dL   Blood: x / Protein: 30 mg/dL / Nitrite: Negative   Leuk Esterase: Trace / RBC: 3-5 /HPF / WBC 3-5   Sq Epi: x / Non Sq Epi: Occasional / Bacteria: Occasional      Urine Culture:     RADIOLOGY & ADDITIONAL STUDIES:
Patient is a 57y old  Female who presents with a chief complaint of pain and nausea (28 May 2017 14:57)    HPI:  58 y/o Female with h/o borderline DM type 2, recent right UVJ kidney stone and UTI was admitted on  for recurrent right flank pain. She was seen in ER on 17 and was dx with 3 mm right UVJ stone with right hydroureteronephrosis and UTI and was d/pushpa on cipro PO. SHe was uncompliant with taking the cipro due to N/V. She came back sec to increasing right flank pain, n/v. Denies any fever.     No further pain  No fever or chills  Feels well    MEDICATIONS  (STANDING):  enoxaparin Injectable 40milliGRAM(s) SubCutaneous every 24 hours  sodium chloride 0.9%. 1000milliLiter(s) IV Continuous <Continuous>  docusate sodium 100milliGRAM(s) Oral three times a day  insulin lispro (HumaLOG) corrective regimen sliding scale  SubCutaneous three times a day before meals  dextrose 5%. 1000milliLiter(s) IV Continuous <Continuous>  dextrose 50% Injectable 12.5Gram(s) IV Push once  dextrose 50% Injectable 25Gram(s) IV Push once  dextrose 50% Injectable 25Gram(s) IV Push once  cefTRIAXone   IVPB 1Gram(s) IV Intermittent every 24 hours  cefTRIAXone   IVPB  IV Intermittent     MEDICATIONS  (PRN):  acetaminophen   Tablet. 650milliGRAM(s) Oral every 6 hours PRN Mild Pain (1 - 3)  ondansetron Injectable 4milliGRAM(s) IV Push every 6 hours PRN Nausea  senna 2Tablet(s) Oral at bedtime PRN Constipation  HYDROmorphone  Injectable 1milliGRAM(s) IV Push every 4 hours PRN Moderate Pain (4 - 6)  dextrose Gel 1Dose(s) Oral once PRN Blood Glucose LESS THAN 70 milliGRAM(s)/deciliter  glucagon  Injectable 1milliGRAM(s) IntraMuscular once PRN Glucose LESS THAN 70 milligrams/deciliter  oxyCODONE IR 5milliGRAM(s) Oral every 6 hours PRN Moderate Pain (4 - 6)      Vital Signs Last 24 Hrs  T(C): 36.6, Max: 37.1 (05-29 @ 20:36)  T(F): 97.9, Max: 98.7 (05-29 @ 20:36)  HR: 84 (81 - 90)  BP: 107/67 (101/49 - 118/65)  BP(mean): --  RR: --  SpO2: 99% (97% - 99%)    Physical Exam:    Constitutional: frail looking  HEENT: NC/AT, EOMI, PERRLA  Neck: supple  Back: no tenderness  Respiratory: clear  Cardiovascular: S1S2 regular, no murmurs  Abdomen: soft, not tender, not distended, positive BS  Genitourinary: right flank tenderness  Rectal: deferred  Musculoskeletal: no muscle tenderness, no joint swelling or tenderness  Extremities: no pedal edema  Neurological: AxOx3, moving all extremities, no focal deficits  Skin: no rashes    Labs:                        12.9   10.1  )-----------( 323      ( 30 May 2017 07:32 )             39.6         142  |  106  |  14  ----------------------------<  129<H>  3.5   |  28  |  0.88    Ca    8.9      30 May 2017 07:32                          13.5   14.5  )-----------( 343      ( 28 May 2017 02:13 )             39.9         136  |  103  |  16  ----------------------------<  222<H>  4.2   |  24  |  1.29    Ca    9.1      28 May 2017 02:13    TPro  8.6<H>  /  Alb  4.0  /  TBili  0.6  /  DBili  x   /  AST  24  /  ALT  38  /  AlkPhos  82  28     LIVER FUNCTIONS - ( 28 May 2017 02:13 )  Alb: 4.0 g/dL / Pro: 8.6 gm/dL / ALK PHOS: 82 U/L / ALT: 38 U/L / AST: 24 U/L / GGT: x           Urinalysis Basic - ( 28 May 2017 02:13 )    Color: Yellow / Appearance: Clear / S.020 / pH: x  Gluc: x / Ketone: Moderate  / Bili: Negative / Urobili: Negative mg/dL   Blood: x / Protein: 30 mg/dL / Nitrite: Negative   Leuk Esterase: Trace / RBC: 3-5 /HPF / WBC 3-5   Sq Epi: x / Non Sq Epi: Occasional / Bacteria: Occasional          Radiology:     CT abdomen and pelvis:  Mild right hydroureteronephrosis secondary to a distal right ureter   calculus measuring 0.3 cm just proximal to the right ureterovesicular   junction. Minimal right renal edema     Advanced directives addressed: full resuscitation
Patient is a 57y old  Female who presents with a chief complaint of pain and nausea (28 May 2017 14:57)    HPI:  58 y/o Female with h/o borderline DM type 2, recent right UVJ kidney stone and UTI was admitted on  for recurrent right flank pain. She was seen in ER on 17 and was dx with 3 mm right UVJ stone with right hydroureteronephrosis and UTI and was d/pushpa on cipro PO. SHe was uncompliant with taking the cipro due to N/V. She came back sec to increasing right flank pain, n/v. Denies any fever.     She passed 2 stones  Nausea and pain is much improved  No fever or chills    MEDICATIONS  (STANDING):  enoxaparin Injectable 40milliGRAM(s) SubCutaneous every 24 hours  sodium chloride 0.9%. 1000milliLiter(s) IV Continuous <Continuous>  docusate sodium 100milliGRAM(s) Oral three times a day  insulin lispro (HumaLOG) corrective regimen sliding scale  SubCutaneous three times a day before meals  dextrose 5%. 1000milliLiter(s) IV Continuous <Continuous>  dextrose 50% Injectable 12.5Gram(s) IV Push once  dextrose 50% Injectable 25Gram(s) IV Push once  dextrose 50% Injectable 25Gram(s) IV Push once  cefTRIAXone   IVPB 1Gram(s) IV Intermittent every 24 hours  cefTRIAXone   IVPB  IV Intermittent     MEDICATIONS  (PRN):  acetaminophen   Tablet. 650milliGRAM(s) Oral every 6 hours PRN Mild Pain (1 - 3)  ondansetron Injectable 4milliGRAM(s) IV Push every 6 hours PRN Nausea  senna 2Tablet(s) Oral at bedtime PRN Constipation  HYDROmorphone  Injectable 1milliGRAM(s) IV Push every 4 hours PRN Moderate Pain (4 - 6)  dextrose Gel 1Dose(s) Oral once PRN Blood Glucose LESS THAN 70 milliGRAM(s)/deciliter  glucagon  Injectable 1milliGRAM(s) IntraMuscular once PRN Glucose LESS THAN 70 milligrams/deciliter      Vital Signs Last 24 Hrs  T(C): 37, Max: 37 (05- @ 21:35)  T(F): 98.6, Max: 98.6 (05- @ 21:35)  HR: 88 (73 - 88)  BP: 115/67 (115/67 - 150/75)  BP(mean): --  RR: 16 (15 - 16)  SpO2: 100% (99% - 100%)    Physical Exam:    Constitutional: frail looking  HEENT: NC/AT, EOMI, PERRLA  Neck: supple  Back: no tenderness  Respiratory: clear  Cardiovascular: S1S2 regular, no murmurs  Abdomen: soft, not tender, not distended, positive BS  Genitourinary: right flank tenderness  Rectal: deferred  Musculoskeletal: no muscle tenderness, no joint swelling or tenderness  Extremities: no pedal edema  Neurological: AxOx3, moving all extremities, no focal deficits  Skin: no rashes    Labs:                        11.0   12.4  )-----------( 272      ( 29 May 2017 06:34 )             33.8         141  |  108  |  11  ----------------------------<  108<H>  4.0   |  26  |  1.15    Ca    8.1<L>      29 May 2017 06:34    TPro  8.6<H>  /  Alb  4.0  /  TBili  0.6  /  DBili  x   /  AST  24  /  ALT  38  /  AlkPhos  82                            13.5   14.5  )-----------( 343      ( 28 May 2017 02:13 )             39.9         136  |  103  |  16  ----------------------------<  222<H>  4.2   |  24  |  1.29    Ca    9.1      28 May 2017 02:13    TPro  8.6<H>  /  Alb  4.0  /  TBili  0.6  /  DBili  x   /  AST  24  /  ALT  38  /  AlkPhos  82  05-28     LIVER FUNCTIONS - ( 28 May 2017 02:13 )  Alb: 4.0 g/dL / Pro: 8.6 gm/dL / ALK PHOS: 82 U/L / ALT: 38 U/L / AST: 24 U/L / GGT: x           Urinalysis Basic - ( 28 May 2017 02:13 )    Color: Yellow / Appearance: Clear / S.020 / pH: x  Gluc: x / Ketone: Moderate  / Bili: Negative / Urobili: Negative mg/dL   Blood: x / Protein: 30 mg/dL / Nitrite: Negative   Leuk Esterase: Trace / RBC: 3-5 /HPF / WBC 3-5   Sq Epi: x / Non Sq Epi: Occasional / Bacteria: Occasional          Radiology:     CT abdomen and pelvis:  Mild right hydroureteronephrosis secondary to a distal right ureter   calculus measuring 0.3 cm just proximal to the right ureterovesicular   junction. Minimal right renal edema     Advanced directives addressed: full resuscitation
Subjective:  Patient is a 57y old  Female who presents with a chief complaint of pain and nausea     HPI:  57/F with PMHx of DM2 controlled with diet and exercise, came to ER on 17 and was dx with 3 mm right UVJ stone with right hydroureteronephrosis and uti and was d/pushpa on cipro which has not taken much sec to n/v.  She came back on  sec to increasing right flank pain, n/v. Denies any fever.      - Patient seen and examined at bedside earlier today, patient passed stones earlier today , reports improvement of right flank pain, no nausea, no vomiting    Review of system- Rest of the review of system are negative except mentioned in HPI    Vital Signs Last 24 Hrs  T(C): 36.9, Max: 37 ( @ 21:35)  T(F): 98.4, Max: 98.6 ( @ 21:35)  HR: 103 (73 - 103)  BP: 120/63 (115/67 - 150/75)  BP(mean): --  RR: 17 (15 - 17)  SpO2: 97% (97% - 100%)    PHYSICAL EXAM:  GENERAL: NAD  NERVOUS SYSTEM:  Alert & Oriented X3, non- focal exam, Motor Strength 5/5 B/L upper and lower extremities; DTRs 2+ intact and symmetric  HEAD:  Atraumatic, Normocephalic  EYES: EOMI, PERRLA, conjunctiva and sclera clear  HEENT: Moist mucous membranes  NECK: Supple, No JVD  CHEST/LUNG: Clear to auscultation bilaterally; No rales, no rhonchi, no wheezing, or rubs  HEART: Regular rate and rhythm; No murmurs, rubs, or gallops  ABDOMEN: Soft, Nontender, Nondistended; Bowel sounds present  GENITOURINARY- Voiding, no suprapubic tenderness  EXTREMITIES:  2+ Peripheral Pulses, No clubbing, cyanosis, or edema  MUSCULOSKELETAL:- No muscle tenderness, Muscle tone normal, No joint tenderness, no Joint swelling, Joint range of motion-normal  SKIN-no rash, no lesion    LABS:                            11.0  12.4  )-----------( 272      ( 29 May 2017 06:34 )            33.8   141  |  108  |  11 ----------------------------<  108<H> 4.0   |  26  |  1.15  Ca    8.1<L>      29 May 2017 06:34  TPro  8.6<H>  /  Alb  4.0  /  TBili  0.6  /  DBili  x   /  AST  24  /  ALT  38  /  AlkPhos  82  05-28 	         Comprehensive Metabolic Panel (17 @ 02:13)   Sodium, Serum: 136 mmol/L   Potassium, Serum: 4.2 mmol/L   Chloride, Serum: 103 mmol/L   Carbon Dioxide, Serum: 24 mmol/L   Anion Gap, Serum: 9 mmol/L   Blood Urea Nitrogen, Serum: 16 mg/dL   Creatinine, Serum: 1.29 mg/dL   Glucose, Serum: 222 mg/dL   Calcium, Total Serum: 9.1 mg/dL   Protein Total, Serum: 8.6 gm/dL   Albumin, Serum: 4.0 g/dL   Bilirubin Total, Serum: 0.6 mg/dL   Alkaline Phosphatase, Serum: 82 U/L   Aspartate Aminotransferase (AST/SGOT): 24 U/L   Alanine Aminotransferase (ALT/SGPT): 38 U/L     	      TPro  8.6<H>  /  Alb  4.0  /  TBili  0.6  /  DBili  x   /  AST  24  /  ALT  38  /  AlkPhos  82  05-28          Urinalysis Basic - ( 28 May 2017 02:13 )    Color: Yellow / Appearance: Clear / S.020 / pH: x  Gluc: x / Ketone: Moderate  / Bili: Negative / Urobili: Negative mg/dL   Blood: x / Protein: 30 mg/dL / Nitrite: Negative   Leuk Esterase: Trace / RBC: 3-5 /HPF / WBC 3-5   Sq Epi: x / Non Sq Epi: Occasional / Bacteria: Occasional    CAPILLARY BLOOD GLUCOSE  125 (29 May 2017 11:45)  107 (29 May 2017 07:53)  164 (28 May 2017 21:35)  128 (28 May 2017 16:55)        CAPILLARY BLOOD GLUCOSE  125 (29 May 2017 11:45)  107 (29 May 2017 07:53)  164 (28 May 2017 21:35)  128 (28 May 2017 16:55)        RECENT CULTURES:  Culture - Urine (17 @ 20:00)    Specimen Source: .Urine Clean Catch (Midstream)    Culture Results:   <10,000 CFU/ml  Normal Urogenital kari present      RADIOLOGY & ADDITIONAL TESTS:   CT ABDOMEN AND PELVIS IC      2017    Mild right hydroureteronephrosis secondary to a distal right ureter   calculus measuring 0.3 cm just proximal to the right uretero vesicular   junction. Minimal right renal edema       Current medications:  enoxaparin Injectable 40milliGRAM(s) SubCutaneous every 24 hours  sodium chloride 0.9%. 1000milliLiter(s) IV Continuous <Continuous>  acetaminophen   Tablet. 650milliGRAM(s) Oral every 6 hours PRN  ondansetron Injectable 4milliGRAM(s) IV Push every 6 hours PRN  senna 2Tablet(s) Oral at bedtime PRN  docusate sodium 100milliGRAM(s) Oral three times a day  HYDROmorphone  Injectable 1milliGRAM(s) IV Push every 4 hours PRN  insulin lispro (HumaLOG) corrective regimen sliding scale  SubCutaneous three times a day before meals  dextrose 5%. 1000milliLiter(s) IV Continuous <Continuous>  dextrose Gel 1Dose(s) Oral once PRN  dextrose 50% Injectable 12.5Gram(s) IV Push once  dextrose 50% Injectable 25Gram(s) IV Push once  dextrose 50% Injectable 25Gram(s) IV Push once  glucagon  Injectable 1milliGRAM(s) IntraMuscular once PRN  cefTRIAXone   IVPB 1Gram(s) IV Intermittent every 24 hours  cefTRIAXone   IVPB  IV Intermittent

## 2017-06-01 LAB — COMPN STONE: SIGNIFICANT CHANGE UP

## 2017-06-02 DIAGNOSIS — E86.0 DEHYDRATION: ICD-10-CM

## 2017-06-02 DIAGNOSIS — N13.6 PYONEPHROSIS: ICD-10-CM

## 2017-06-02 DIAGNOSIS — Z88.0 ALLERGY STATUS TO PENICILLIN: ICD-10-CM

## 2017-06-02 DIAGNOSIS — D72.829 ELEVATED WHITE BLOOD CELL COUNT, UNSPECIFIED: ICD-10-CM

## 2017-06-02 DIAGNOSIS — E11.9 TYPE 2 DIABETES MELLITUS WITHOUT COMPLICATIONS: ICD-10-CM

## 2017-06-23 ENCOUNTER — EMERGENCY (EMERGENCY)
Facility: HOSPITAL | Age: 57
LOS: 0 days | Discharge: HOME | End: 2017-06-23
Attending: EMERGENCY MEDICINE | Admitting: EMERGENCY MEDICINE
Payer: COMMERCIAL

## 2017-06-23 VITALS
HEART RATE: 84 BPM | DIASTOLIC BLOOD PRESSURE: 80 MMHG | OXYGEN SATURATION: 100 % | SYSTOLIC BLOOD PRESSURE: 124 MMHG | RESPIRATION RATE: 15 BRPM | TEMPERATURE: 98 F

## 2017-06-23 VITALS — HEIGHT: 67 IN

## 2017-06-23 DIAGNOSIS — E11.9 TYPE 2 DIABETES MELLITUS WITHOUT COMPLICATIONS: ICD-10-CM

## 2017-06-23 DIAGNOSIS — Z96.659 PRESENCE OF UNSPECIFIED ARTIFICIAL KNEE JOINT: Chronic | ICD-10-CM

## 2017-06-23 DIAGNOSIS — Z98.891 HISTORY OF UTERINE SCAR FROM PREVIOUS SURGERY: Chronic | ICD-10-CM

## 2017-06-23 DIAGNOSIS — R10.9 UNSPECIFIED ABDOMINAL PAIN: ICD-10-CM

## 2017-06-23 DIAGNOSIS — N20.0 CALCULUS OF KIDNEY: ICD-10-CM

## 2017-06-23 LAB
ALBUMIN SERPL ELPH-MCNC: 3.9 G/DL — SIGNIFICANT CHANGE UP (ref 3.3–5)
ALP SERPL-CCNC: 84 U/L — SIGNIFICANT CHANGE UP (ref 40–120)
ALT FLD-CCNC: 33 U/L — SIGNIFICANT CHANGE UP (ref 12–78)
ANION GAP SERPL CALC-SCNC: 4 MMOL/L — LOW (ref 5–17)
APPEARANCE UR: CLEAR — SIGNIFICANT CHANGE UP
AST SERPL-CCNC: 20 U/L — SIGNIFICANT CHANGE UP (ref 15–37)
BACTERIA # UR AUTO: (no result)
BASOPHILS # BLD AUTO: 0.1 K/UL — SIGNIFICANT CHANGE UP (ref 0–0.2)
BASOPHILS NFR BLD AUTO: 0.6 % — SIGNIFICANT CHANGE UP (ref 0–2)
BILIRUB SERPL-MCNC: 0.3 MG/DL — SIGNIFICANT CHANGE UP (ref 0.2–1.2)
BILIRUB UR-MCNC: NEGATIVE — SIGNIFICANT CHANGE UP
BUN SERPL-MCNC: 15 MG/DL — SIGNIFICANT CHANGE UP (ref 7–23)
CALCIUM SERPL-MCNC: 9.2 MG/DL — SIGNIFICANT CHANGE UP (ref 8.5–10.1)
CHLORIDE SERPL-SCNC: 107 MMOL/L — SIGNIFICANT CHANGE UP (ref 96–108)
CO2 SERPL-SCNC: 26 MMOL/L — SIGNIFICANT CHANGE UP (ref 22–31)
COLOR SPEC: YELLOW — SIGNIFICANT CHANGE UP
CREAT SERPL-MCNC: 0.81 MG/DL — SIGNIFICANT CHANGE UP (ref 0.5–1.3)
DIFF PNL FLD: (no result)
EOSINOPHIL # BLD AUTO: 0 K/UL — SIGNIFICANT CHANGE UP (ref 0–0.5)
EOSINOPHIL NFR BLD AUTO: 0.3 % — SIGNIFICANT CHANGE UP (ref 0–6)
EPI CELLS # UR: (no result)
GLUCOSE SERPL-MCNC: 179 MG/DL — HIGH (ref 70–99)
GLUCOSE UR QL: 50 MG/DL
HCT VFR BLD CALC: 39.9 % — SIGNIFICANT CHANGE UP (ref 34.5–45)
HGB BLD-MCNC: 13 G/DL — SIGNIFICANT CHANGE UP (ref 11.5–15.5)
KETONES UR-MCNC: NEGATIVE — SIGNIFICANT CHANGE UP
LEUKOCYTE ESTERASE UR-ACNC: NEGATIVE — SIGNIFICANT CHANGE UP
LYMPHOCYTES # BLD AUTO: 1.9 K/UL — SIGNIFICANT CHANGE UP (ref 1–3.3)
LYMPHOCYTES # BLD AUTO: 13.5 % — SIGNIFICANT CHANGE UP (ref 13–44)
MCHC RBC-ENTMCNC: 26.4 PG — LOW (ref 27–34)
MCHC RBC-ENTMCNC: 32.6 GM/DL — SIGNIFICANT CHANGE UP (ref 32–36)
MCV RBC AUTO: 81.2 FL — SIGNIFICANT CHANGE UP (ref 80–100)
MONOCYTES # BLD AUTO: 0.6 K/UL — SIGNIFICANT CHANGE UP (ref 0–0.9)
MONOCYTES NFR BLD AUTO: 4.6 % — SIGNIFICANT CHANGE UP (ref 2–14)
NEUTROPHILS # BLD AUTO: 11.1 K/UL — HIGH (ref 1.8–7.4)
NEUTROPHILS NFR BLD AUTO: 81 % — HIGH (ref 43–77)
NITRITE UR-MCNC: NEGATIVE — SIGNIFICANT CHANGE UP
PH UR: 5 — SIGNIFICANT CHANGE UP (ref 5–8)
PLATELET # BLD AUTO: 312 K/UL — SIGNIFICANT CHANGE UP (ref 150–400)
POTASSIUM SERPL-MCNC: 4.4 MMOL/L — SIGNIFICANT CHANGE UP (ref 3.5–5.3)
POTASSIUM SERPL-SCNC: 4.4 MMOL/L — SIGNIFICANT CHANGE UP (ref 3.5–5.3)
PROT SERPL-MCNC: 8.1 GM/DL — SIGNIFICANT CHANGE UP (ref 6–8.3)
PROT UR-MCNC: 15 MG/DL
RBC # BLD: 4.91 M/UL — SIGNIFICANT CHANGE UP (ref 3.8–5.2)
RBC # FLD: 12.5 % — SIGNIFICANT CHANGE UP (ref 10.3–14.5)
RBC CASTS # UR COMP ASSIST: (no result) /HPF (ref 0–4)
SODIUM SERPL-SCNC: 137 MMOL/L — SIGNIFICANT CHANGE UP (ref 135–145)
SP GR SPEC: 1.01 — SIGNIFICANT CHANGE UP (ref 1.01–1.02)
UROBILINOGEN FLD QL: NEGATIVE MG/DL — SIGNIFICANT CHANGE UP
WBC # BLD: 13.7 K/UL — HIGH (ref 3.8–10.5)
WBC # FLD AUTO: 13.7 K/UL — HIGH (ref 3.8–10.5)
WBC UR QL: SIGNIFICANT CHANGE UP

## 2017-06-23 PROCEDURE — 74176 CT ABD & PELVIS W/O CONTRAST: CPT | Mod: 26

## 2017-06-23 PROCEDURE — 99285 EMERGENCY DEPT VISIT HI MDM: CPT

## 2017-06-23 RX ORDER — KETOROLAC TROMETHAMINE 30 MG/ML
30 SYRINGE (ML) INJECTION ONCE
Qty: 0 | Refills: 0 | Status: DISCONTINUED | OUTPATIENT
Start: 2017-06-23 | End: 2017-06-23

## 2017-06-23 RX ORDER — SODIUM CHLORIDE 9 MG/ML
1000 INJECTION INTRAMUSCULAR; INTRAVENOUS; SUBCUTANEOUS ONCE
Qty: 0 | Refills: 0 | Status: COMPLETED | OUTPATIENT
Start: 2017-06-23 | End: 2017-06-23

## 2017-06-23 RX ORDER — TAMSULOSIN HYDROCHLORIDE 0.4 MG/1
1 CAPSULE ORAL
Qty: 7 | Refills: 0 | OUTPATIENT
Start: 2017-06-23 | End: 2017-06-30

## 2017-06-23 RX ORDER — MOXIFLOXACIN HYDROCHLORIDE TABLETS, 400 MG 400 MG/1
1 TABLET, FILM COATED ORAL
Qty: 10 | Refills: 0 | OUTPATIENT
Start: 2017-06-23 | End: 2017-06-28

## 2017-06-23 RX ORDER — SODIUM CHLORIDE 9 MG/ML
3 INJECTION INTRAMUSCULAR; INTRAVENOUS; SUBCUTANEOUS ONCE
Qty: 0 | Refills: 0 | Status: COMPLETED | OUTPATIENT
Start: 2017-06-23 | End: 2017-06-23

## 2017-06-23 RX ADMIN — SODIUM CHLORIDE 1000 MILLILITER(S): 9 INJECTION INTRAMUSCULAR; INTRAVENOUS; SUBCUTANEOUS at 11:08

## 2017-06-23 RX ADMIN — Medication 30 MILLIGRAM(S): at 11:08

## 2017-06-23 RX ADMIN — SODIUM CHLORIDE 3 MILLILITER(S): 9 INJECTION INTRAMUSCULAR; INTRAVENOUS; SUBCUTANEOUS at 11:08

## 2017-06-23 NOTE — ED STATDOCS - PROGRESS NOTE DETAILS
57 yr. old female PMH: Type 2 Diabetes  presents to ED with right flank pain radiating to back 57 yr. old female PMH: Type 2 Diabetes  presents to ED with right flank pain radiating to abdomen onset this am + nausea + vomiting = urgency no frequency. No fever or chills. PMD: Dr. Miranda and Dr. Vallecillo. Seen and examined by attending in intake. Plan IV, IV Fluids,  pain medication toradol and CT abd./Pelvis. Will F/U with results and re evaluate. Lona NP Patient feeling better. IV fluids still infusing. MTangredi NP 57 yr. old female PMH: Type 2 Diabetes  presents to ED with right flank pain radiating to abdomen onset this am + nausea + vomiting +urgency no frequency. No fever or chills. PMD: Dr. Miranda and Dr. Vallecillo. Seen and examined by attending in intake. Plan IV, IV Fluids,  pain medication toradol and CT abd./Pelvis. Will F/U with results and re evaluate. Lona NP

## 2017-06-23 NOTE — ED ADULT NURSE NOTE - OBJECTIVE STATEMENT
pt received in [facility]track. pt is awake alert oriented following commands and speaking coherently. pt presents to er c/o right abdominal pain and right flank pain with urgency to urinate. pt reports hx of stones and states ot feels similar. abdomen is soft and tender to touch on right side, right CVA tenderness. resp even and unlabored. lung sounds cta bl. reports some vomiting. denies fever chills chest pain. in no apparent distress

## 2017-06-23 NOTE — ED STATDOCS - OBJECTIVE STATEMENT
58 y/o female pt w/ hx of Type 2 DM presents to the ED c/o right flank pain radiating to the back. Pt was seeing Dr. Vallecillo for her cysteine stones but the pain has worsened before her next scheduled appointment. PT is allergic to Penicillin. Denies SOB and fever/chills.

## 2017-10-12 NOTE — ED STATDOCS - SKIN, MLM
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    :    Restrictions:  None.     Full Duty Work Demands: light (10 - 20 lbs), standing >50% of the day and rotational/twisting motions    Claim Number: [id number]  Claim Status: claim open and in good standing  Current Work Status: full time occupation: packaging     Next Referring Provider Visit: 9/8/2017    Referred by: Marianne Alberto MD  Medical Diagnosis (from order):  Left knee pain  Insurance: 1. [employer]  2. N/A    Date of Onset: 6/12/2017   Diagnosis Precautions: none  Chart reviewed: Relevant co-morbidities, allergies, tests and medications: Muscle relaxants.       SUBJECTIVE:   Pt reports that the swelling seems to be reduced since beginning to use the IFC treatments. She continues to have little improvement in L knee pain.      Current Pain: 7/10.    Functional Change: None reported    OBJECTIVE:   Tight and painful along the ITB and vastus lateralis, medial hamstring  Decreased medial and lateral patellar glides    Treatment:   Therapeutic Exercise:   Nu step for ROM x 5 minutes  Hamstring stretch in dionne stretch  Knee flexion PROM in dionne stretch  Standing hip abduction with yellow theraband 1x10 bilaterally  Standing hip extension with yellow theraband 1x10 bilaterally      Manual Therapy:   Soft tissue mobilizations to the L ITB, vastus lateralis, and hamstrings  Patellar mobs    Modalities:  Patient has been made aware of potential contraindications and possible risks associated with the use of the following modalities and has agreed.  Un-attended Electrical Stimulation (43741/): IFC   Location: L knee; Position: supine; Duration: 20 minutes; 2.00 in (5.0 cm) Round electrodes placed around the L knee.   Parameters: Pulse rate:  Hz; Intensity: patient  comfort`   Modality treatment resulted in decreased pain.  Patient reports no adverse reaction to treatment.    Current Home Program (not performed this date except as noted above):   Heel slides with encouragement in supine and in sitting  Quad sets with cueing- 5 second holds  Hip abduction in supine to fatigue  gastroc stretch with towel in long sitting  * above denotes home program issuance as well    ASSESSMENT   Pt continues to report elevated levels of pain. She does tolerate hip strengthening with mild increase in pain. She is tender with Soft tissue mobilization and patellar mobs.    Pain after treatment: \"feels better\"/10  Result of above outlined education: Verbalizes understanding and Demonstrates understanding    Goals:       See evaluation     PLAN   Continue with hip and knee strengthening, patellar mobs, Soft tissue mobilization and IFC.    THERAPY DAILY BILLING   Primary Insurance: [employer]  Secondary Insurance: N/A    Evaluation Procedures:  No evaluation codes were used on this date of service    Timed Procedures:  Manual Therapy, 15 minutes  Therapeutic Exercise, 15 minutes    Untimed Procedures:  Electrostimulation Unattended    Total Treatment Time: 50 minutes        skin normal color for race, warm, dry and intact.

## 2018-05-30 ENCOUNTER — INPATIENT (INPATIENT)
Facility: HOSPITAL | Age: 58
LOS: 2 days | Discharge: HOME | End: 2018-06-02
Attending: INTERNAL MEDICINE | Admitting: INTERNAL MEDICINE
Payer: COMMERCIAL

## 2018-05-30 VITALS — HEIGHT: 66 IN | WEIGHT: 160.06 LBS

## 2018-05-30 DIAGNOSIS — Z96.659 PRESENCE OF UNSPECIFIED ARTIFICIAL KNEE JOINT: Chronic | ICD-10-CM

## 2018-05-30 DIAGNOSIS — Z98.891 HISTORY OF UTERINE SCAR FROM PREVIOUS SURGERY: Chronic | ICD-10-CM

## 2018-05-30 LAB
ALBUMIN SERPL ELPH-MCNC: 3.9 G/DL — SIGNIFICANT CHANGE UP (ref 3.3–5)
ALP SERPL-CCNC: 91 U/L — SIGNIFICANT CHANGE UP (ref 40–120)
ALT FLD-CCNC: 33 U/L — SIGNIFICANT CHANGE UP (ref 12–78)
ANION GAP SERPL CALC-SCNC: 7 MMOL/L — SIGNIFICANT CHANGE UP (ref 5–17)
APPEARANCE UR: (no result)
APTT BLD: 30.3 SEC — SIGNIFICANT CHANGE UP (ref 27.5–37.4)
AST SERPL-CCNC: 21 U/L — SIGNIFICANT CHANGE UP (ref 15–37)
BACTERIA # UR AUTO: (no result)
BASOPHILS # BLD AUTO: 0.04 K/UL — SIGNIFICANT CHANGE UP (ref 0–0.2)
BASOPHILS NFR BLD AUTO: 0.4 % — SIGNIFICANT CHANGE UP (ref 0–2)
BILIRUB SERPL-MCNC: 0.4 MG/DL — SIGNIFICANT CHANGE UP (ref 0.2–1.2)
BILIRUB UR-MCNC: NEGATIVE — SIGNIFICANT CHANGE UP
BLD GP AB SCN SERPL QL: SIGNIFICANT CHANGE UP
BUN SERPL-MCNC: 12 MG/DL — SIGNIFICANT CHANGE UP (ref 7–23)
CALCIUM SERPL-MCNC: 8.9 MG/DL — SIGNIFICANT CHANGE UP (ref 8.5–10.1)
CHLORIDE SERPL-SCNC: 102 MMOL/L — SIGNIFICANT CHANGE UP (ref 96–108)
CO2 SERPL-SCNC: 25 MMOL/L — SIGNIFICANT CHANGE UP (ref 22–31)
COLOR SPEC: YELLOW — SIGNIFICANT CHANGE UP
CREAT SERPL-MCNC: 0.93 MG/DL — SIGNIFICANT CHANGE UP (ref 0.5–1.3)
DIFF PNL FLD: (no result)
EOSINOPHIL # BLD AUTO: 0.07 K/UL — SIGNIFICANT CHANGE UP (ref 0–0.5)
EOSINOPHIL NFR BLD AUTO: 0.6 % — SIGNIFICANT CHANGE UP (ref 0–6)
EPI CELLS # UR: SIGNIFICANT CHANGE UP
GLUCOSE BLDC GLUCOMTR-MCNC: 186 MG/DL — HIGH (ref 70–99)
GLUCOSE SERPL-MCNC: 261 MG/DL — HIGH (ref 70–99)
GLUCOSE UR QL: 1000 MG/DL
HCT VFR BLD CALC: 39.1 % — SIGNIFICANT CHANGE UP (ref 34.5–45)
HGB BLD-MCNC: 12.7 G/DL — SIGNIFICANT CHANGE UP (ref 11.5–15.5)
IMM GRANULOCYTES NFR BLD AUTO: 0.5 % — SIGNIFICANT CHANGE UP (ref 0–1.5)
INR BLD: 0.98 RATIO — SIGNIFICANT CHANGE UP (ref 0.88–1.16)
KETONES UR-MCNC: NEGATIVE — SIGNIFICANT CHANGE UP
LEUKOCYTE ESTERASE UR-ACNC: (no result)
LYMPHOCYTES # BLD AUTO: 2.34 K/UL — SIGNIFICANT CHANGE UP (ref 1–3.3)
LYMPHOCYTES # BLD AUTO: 20.9 % — SIGNIFICANT CHANGE UP (ref 13–44)
MCHC RBC-ENTMCNC: 26.4 PG — LOW (ref 27–34)
MCHC RBC-ENTMCNC: 32.5 GM/DL — SIGNIFICANT CHANGE UP (ref 32–36)
MCV RBC AUTO: 81.3 FL — SIGNIFICANT CHANGE UP (ref 80–100)
MONOCYTES # BLD AUTO: 0.54 K/UL — SIGNIFICANT CHANGE UP (ref 0–0.9)
MONOCYTES NFR BLD AUTO: 4.8 % — SIGNIFICANT CHANGE UP (ref 2–14)
NEUTROPHILS # BLD AUTO: 8.12 K/UL — HIGH (ref 1.8–7.4)
NEUTROPHILS NFR BLD AUTO: 72.8 % — SIGNIFICANT CHANGE UP (ref 43–77)
NITRITE UR-MCNC: NEGATIVE — SIGNIFICANT CHANGE UP
NRBC # BLD: 0 /100 WBCS — SIGNIFICANT CHANGE UP (ref 0–0)
PH UR: 6.5 — SIGNIFICANT CHANGE UP (ref 5–8)
PLATELET # BLD AUTO: 328 K/UL — SIGNIFICANT CHANGE UP (ref 150–400)
POTASSIUM SERPL-MCNC: 4 MMOL/L — SIGNIFICANT CHANGE UP (ref 3.5–5.3)
POTASSIUM SERPL-SCNC: 4 MMOL/L — SIGNIFICANT CHANGE UP (ref 3.5–5.3)
PROT SERPL-MCNC: 8.3 GM/DL — SIGNIFICANT CHANGE UP (ref 6–8.3)
PROT UR-MCNC: 30 MG/DL
PROTHROM AB SERPL-ACNC: 10.6 SEC — SIGNIFICANT CHANGE UP (ref 9.8–12.7)
RBC # BLD: 4.81 M/UL — SIGNIFICANT CHANGE UP (ref 3.8–5.2)
RBC # FLD: 13.7 % — SIGNIFICANT CHANGE UP (ref 10.3–14.5)
RBC CASTS # UR COMP ASSIST: >50 /HPF (ref 0–4)
SODIUM SERPL-SCNC: 134 MMOL/L — LOW (ref 135–145)
SP GR SPEC: 1.01 — SIGNIFICANT CHANGE UP (ref 1.01–1.02)
TYPE + AB SCN PNL BLD: SIGNIFICANT CHANGE UP
UROBILINOGEN FLD QL: NEGATIVE MG/DL — SIGNIFICANT CHANGE UP
WBC # BLD: 11.17 K/UL — HIGH (ref 3.8–10.5)
WBC # FLD AUTO: 11.17 K/UL — HIGH (ref 3.8–10.5)
WBC UR QL: SIGNIFICANT CHANGE UP

## 2018-05-30 PROCEDURE — 99285 EMERGENCY DEPT VISIT HI MDM: CPT

## 2018-05-30 PROCEDURE — 74176 CT ABD & PELVIS W/O CONTRAST: CPT | Mod: 26

## 2018-05-30 RX ORDER — DEXTROSE 50 % IN WATER 50 %
25 SYRINGE (ML) INTRAVENOUS ONCE
Qty: 0 | Refills: 0 | Status: DISCONTINUED | OUTPATIENT
Start: 2018-05-30 | End: 2018-06-02

## 2018-05-30 RX ORDER — MORPHINE SULFATE 50 MG/1
2 CAPSULE, EXTENDED RELEASE ORAL EVERY 4 HOURS
Qty: 0 | Refills: 0 | Status: DISCONTINUED | OUTPATIENT
Start: 2018-05-30 | End: 2018-06-02

## 2018-05-30 RX ORDER — CEFTRIAXONE 500 MG/1
INJECTION, POWDER, FOR SOLUTION INTRAMUSCULAR; INTRAVENOUS
Qty: 0 | Refills: 0 | Status: DISCONTINUED | OUTPATIENT
Start: 2018-05-30 | End: 2018-05-30

## 2018-05-30 RX ORDER — DEXTROSE 50 % IN WATER 50 %
12.5 SYRINGE (ML) INTRAVENOUS ONCE
Qty: 0 | Refills: 0 | Status: DISCONTINUED | OUTPATIENT
Start: 2018-05-30 | End: 2018-06-02

## 2018-05-30 RX ORDER — GLUCAGON INJECTION, SOLUTION 0.5 MG/.1ML
1 INJECTION, SOLUTION SUBCUTANEOUS ONCE
Qty: 0 | Refills: 0 | Status: DISCONTINUED | OUTPATIENT
Start: 2018-05-30 | End: 2018-06-02

## 2018-05-30 RX ORDER — SODIUM CHLORIDE 9 MG/ML
1000 INJECTION, SOLUTION INTRAVENOUS
Qty: 0 | Refills: 0 | Status: DISCONTINUED | OUTPATIENT
Start: 2018-05-30 | End: 2018-06-02

## 2018-05-30 RX ORDER — DEXTROSE 50 % IN WATER 50 %
15 SYRINGE (ML) INTRAVENOUS ONCE
Qty: 0 | Refills: 0 | Status: DISCONTINUED | OUTPATIENT
Start: 2018-05-30 | End: 2018-06-02

## 2018-05-30 RX ORDER — INSULIN LISPRO 100/ML
VIAL (ML) SUBCUTANEOUS
Qty: 0 | Refills: 0 | Status: DISCONTINUED | OUTPATIENT
Start: 2018-05-30 | End: 2018-06-02

## 2018-05-30 RX ORDER — ACETAMINOPHEN 500 MG
650 TABLET ORAL EVERY 6 HOURS
Qty: 0 | Refills: 0 | Status: DISCONTINUED | OUTPATIENT
Start: 2018-05-30 | End: 2018-06-02

## 2018-05-30 RX ORDER — MORPHINE SULFATE 50 MG/1
4 CAPSULE, EXTENDED RELEASE ORAL ONCE
Qty: 0 | Refills: 0 | Status: DISCONTINUED | OUTPATIENT
Start: 2018-05-30 | End: 2018-05-30

## 2018-05-30 RX ORDER — SODIUM CHLORIDE 9 MG/ML
1000 INJECTION INTRAMUSCULAR; INTRAVENOUS; SUBCUTANEOUS
Qty: 0 | Refills: 0 | Status: DISCONTINUED | OUTPATIENT
Start: 2018-05-30 | End: 2018-06-02

## 2018-05-30 RX ORDER — ONDANSETRON 8 MG/1
4 TABLET, FILM COATED ORAL ONCE
Qty: 0 | Refills: 0 | Status: COMPLETED | OUTPATIENT
Start: 2018-05-30 | End: 2018-05-30

## 2018-05-30 RX ORDER — SODIUM CHLORIDE 9 MG/ML
1000 INJECTION INTRAMUSCULAR; INTRAVENOUS; SUBCUTANEOUS ONCE
Qty: 0 | Refills: 0 | Status: COMPLETED | OUTPATIENT
Start: 2018-05-30 | End: 2018-05-30

## 2018-05-30 RX ORDER — CEFTRIAXONE 500 MG/1
1000 INJECTION, POWDER, FOR SOLUTION INTRAMUSCULAR; INTRAVENOUS ONCE
Qty: 0 | Refills: 0 | Status: COMPLETED | OUTPATIENT
Start: 2018-05-30 | End: 2018-05-30

## 2018-05-30 RX ORDER — CEFTRIAXONE 500 MG/1
1000 INJECTION, POWDER, FOR SOLUTION INTRAMUSCULAR; INTRAVENOUS EVERY 24 HOURS
Qty: 0 | Refills: 0 | Status: DISCONTINUED | OUTPATIENT
Start: 2018-05-31 | End: 2018-06-02

## 2018-05-30 RX ORDER — CEFTRIAXONE 500 MG/1
INJECTION, POWDER, FOR SOLUTION INTRAMUSCULAR; INTRAVENOUS
Qty: 0 | Refills: 0 | Status: DISCONTINUED | OUTPATIENT
Start: 2018-05-30 | End: 2018-06-02

## 2018-05-30 RX ORDER — ONDANSETRON 8 MG/1
4 TABLET, FILM COATED ORAL EVERY 6 HOURS
Qty: 0 | Refills: 0 | Status: DISCONTINUED | OUTPATIENT
Start: 2018-05-30 | End: 2018-06-02

## 2018-05-30 RX ADMIN — MORPHINE SULFATE 4 MILLIGRAM(S): 50 CAPSULE, EXTENDED RELEASE ORAL at 14:25

## 2018-05-30 RX ADMIN — ONDANSETRON 4 MILLIGRAM(S): 8 TABLET, FILM COATED ORAL at 19:23

## 2018-05-30 RX ADMIN — CEFTRIAXONE 1000 MILLIGRAM(S): 500 INJECTION, POWDER, FOR SOLUTION INTRAMUSCULAR; INTRAVENOUS at 17:00

## 2018-05-30 RX ADMIN — SODIUM CHLORIDE 1000 MILLILITER(S): 9 INJECTION INTRAMUSCULAR; INTRAVENOUS; SUBCUTANEOUS at 10:59

## 2018-05-30 RX ADMIN — Medication 2: at 17:08

## 2018-05-30 RX ADMIN — ONDANSETRON 4 MILLIGRAM(S): 8 TABLET, FILM COATED ORAL at 10:58

## 2018-05-30 RX ADMIN — ONDANSETRON 4 MILLIGRAM(S): 8 TABLET, FILM COATED ORAL at 14:25

## 2018-05-30 RX ADMIN — MORPHINE SULFATE 2 MILLIGRAM(S): 50 CAPSULE, EXTENDED RELEASE ORAL at 20:09

## 2018-05-30 RX ADMIN — SODIUM CHLORIDE 150 MILLILITER(S): 9 INJECTION INTRAMUSCULAR; INTRAVENOUS; SUBCUTANEOUS at 19:23

## 2018-05-30 RX ADMIN — MORPHINE SULFATE 2 MILLIGRAM(S): 50 CAPSULE, EXTENDED RELEASE ORAL at 20:47

## 2018-05-30 RX ADMIN — MORPHINE SULFATE 4 MILLIGRAM(S): 50 CAPSULE, EXTENDED RELEASE ORAL at 10:59

## 2018-05-30 RX ADMIN — SODIUM CHLORIDE 150 MILLILITER(S): 9 INJECTION INTRAMUSCULAR; INTRAVENOUS; SUBCUTANEOUS at 17:09

## 2018-05-30 NOTE — ED PROVIDER NOTE - NS_ ATTENDINGSCRIBEDETAILS _ED_A_ED_FT
Patt Singh MD,  (Attending): The history, relevant review of systems, past medical and surgical history, medical decision making, and physical examination was documented by the scribe in my presence and I attest to the accuracy of the documentation.

## 2018-05-30 NOTE — ED ADULT NURSE NOTE - CAS EDN DISCHARGE INTERVENTIONS
Patient is a 48y old  Male who presents with a chief complaint of abdominal pain (02 May 2018 23:52)      SUBJECTIVE / OVERNIGHT EVENTS:  No BM since yesterday.  MEDICATIONS  (STANDING):  aspirin  chewable 81 milliGRAM(s) Oral daily  ciprofloxacin   IVPB 400 milliGRAM(s) IV Intermittent every 12 hours  gabapentin 600 milliGRAM(s) Oral three times a day  metoprolol succinate ER 25 milliGRAM(s) Oral daily  metroNIDAZOLE  IVPB 500 milliGRAM(s) IV Intermittent every 8 hours  pantoprazole    Tablet 40 milliGRAM(s) Oral before breakfast    MEDICATIONS  (PRN):  acetaminophen   Tablet. 650 milliGRAM(s) Oral every 6 hours PRN Mild Pain (1 - 3)  morphine  - Injectable 2 milliGRAM(s) IV Push every 4 hours PRN moderate to severe pain              PHYSICAL EXAM:  GENERAL: NAD, well-developed  HEAD:  Atraumatic, Normocephalic  EYES: EOMI, PERRLA, conjunctiva and sclera anicteric  NECK: Supple, No JVD  CHEST/LUNG: Clear to auscultation bilaterally; No wheeze  HEART: Regular rate and rhythm; No murmurs, rubs, or gallops  ABDOMEN: Soft, L side mildly tender, Nondistended; Bowel sounds present, no hepatosplenomegaly, no rebound or guarding  EXTREMITIES:  2+ Peripheral Pulses, No clubbing, cyanosis, or edema  PSYCH: AAOx3  NEUROLOGY: non-focal, no asterixis  SKIN: No rashes or lesion    LABS:                        14.6   6.54  )-----------( 201      ( 04 May 2018 05:35 )             45.5     05-04    140  |  102  |  8   ----------------------------<  98  4.3   |  25  |  1.11    Ca    8.8      04 May 2018 05:35  Phos  3.3     05-03  Mg     1.9     05-03    TPro  6.1  /  Alb  3.7  /  TBili  1.5<H>  /  DBili  x   /  AST  13  /  ALT  13  /  AlkPhos  62  05-04    LIVER FUNCTIONS - ( 04 May 2018 05:35 )  Alb: 3.7 g/dL / Pro: 6.1 g/dL / ALK PHOS: 62 u/L / ALT: 13 u/L / AST: 13 u/L / GGT: x           PT/INR - ( 02 May 2018 17:33 )   PT: 11.3 SEC;   INR: 0.98          PTT - ( 02 May 2018 17:33 )  PTT:27.7 SEC          RADIOLOGY & ADDITIONAL TESTS:
CHIEF COMPLAINT:Patient is a 48y old  Male who presents with a chief complaint of abdominal pain (02 May 2018 23:52)    	        PAST MEDICAL & SURGICAL HISTORY:  Coronary artery disease involving native coronary artery of native heart without angina pectoris  GERD (gastroesophageal reflux disease)  HTN (hypertension)  Sciatic Nerve Disease  S/P transurethral resection of prostate: 2011          REVIEW OF SYSTEMS:  CONSTITUTIONAL: No fever, weight loss, or fatigue  EYES: No eye pain, visual disturbances, or discharge  NECK: No pain or stiffness  RESPIRATORY: No cough, wheezing, chills or hemoptysis; No Shortness of Breath  CARDIOVASCULAR: No chest pain, palpitations, passing out, dizziness, or leg swelling  GASTROINTESTINAL: No abdominal or epigastric pain. No nausea, vomiting, or hematemesis; No diarrhea or constipation. No melena or hematochezia.  GENITOURINARY: No dysuria, frequency, hematuria, or incontinence  NEUROLOGICAL: No headaches, memory loss, loss of strength, numbness, or tremors  SKIN: No itching, burning, rashes, or lesions   LYMPH Nodes: No enlarged glands  ENDOCRINE: No heat or cold intolerance; No hair loss  MUSCULOSKELETAL: No joint pain or swelling; No muscle, back, or extremity pain    Medications:  MEDICATIONS  (STANDING):  aspirin  chewable 81 milliGRAM(s) Oral daily  ciprofloxacin   IVPB 400 milliGRAM(s) IV Intermittent every 12 hours  gabapentin 600 milliGRAM(s) Oral three times a day  metoprolol succinate ER 25 milliGRAM(s) Oral daily  metroNIDAZOLE  IVPB 500 milliGRAM(s) IV Intermittent every 8 hours  pantoprazole    Tablet 40 milliGRAM(s) Oral before breakfast    MEDICATIONS  (PRN):  acetaminophen   Tablet. 650 milliGRAM(s) Oral every 6 hours PRN Mild Pain (1 - 3)  morphine  - Injectable 2 milliGRAM(s) IV Push every 4 hours PRN moderate to severe pain    	    PHYSICAL EXAM:  T(C): 36.8 (05-05-18 @ 05:34), Max: 36.8 (05-05-18 @ 05:34)  HR: 58 (05-05-18 @ 05:34) (58 - 59)  BP: 105/70 (05-05-18 @ 05:34) (105/68 - 110/69)  RR: 18 (05-05-18 @ 05:34) (16 - 18)  SpO2: 99% (05-05-18 @ 05:34) (98% - 99%)  Wt(kg): --  I&O's Summary      Appearance: Normal	  HEENT:   Normal oral mucosa, PERRL, EOMI	  Lymphatic: No lymphadenopathy  Cardiovascular: Normal S1 S2, No JVD, No murmurs, No edema  Respiratory: Lungs clear to auscultation	  Psychiatry: A & O x 3, Mood & affect appropriate  Gastrointestinal:  Soft, Non-tender, + BS	  Skin: No rashes, No ecchymoses, No cyanosis	  Neurologic: Non-focal  Extremities: Normal range of motion, No clubbing, cyanosis or edema  Vascular: Peripheral pulses palpable 2+ bilaterally    TELEMETRY: 	    ECG:  	  RADIOLOGY:  OTHER: 	  	  LABS:	 	    CARDIAC MARKERS:                                14.7   6.39  )-----------( 195      ( 05 May 2018 06:00 )             44.4     05-04    140  |  102  |  8   ----------------------------<  98  4.3   |  25  |  1.11    Ca    8.8      04 May 2018 05:35    TPro  6.1  /  Alb  3.7  /  TBili  1.5<H>  /  DBili  x   /  AST  13  /  ALT  13  /  AlkPhos  62  05-04    proBNP:   Lipid Profile:   HgA1c:   TSH:
CHIEF COMPLAINT:Patient is a 48y old  Male who presents with a chief complaint of abdominal pain (02 May 2018 23:52)      Allergies:  No Known Allergies      PAST MEDICAL & SURGICAL HISTORY:  Coronary artery disease involving native coronary artery of native heart without angina pectoris  GERD (gastroesophageal reflux disease)  HTN (hypertension)  Sciatic Nerve Disease  S/P transurethral resection of prostate: 2011      FAMILY HISTORY:  No pertinent family history in first degree relatives      REVIEW OF SYSTEMS:  CONSTITUTIONAL: No fever, weight loss, or fatigue  EYES: No eye pain, visual disturbances, or discharge  NECK: No pain or stiffness  RESPIRATORY: No cough or wheezing, no shortness of breath  CARDIOVASCULAR: No chest pain, palpitations, dizziness, or leg swelling  GASTROINTESTINAL: + abd pain. No nausea, vomiting, diarrhea or constipation, + BRBPR  GENITOURINARY: No dysuria, urinary frequency or urgency, no hematuria  NEUROLOGICAL: No headaches, memory loss, loss of strength, numbness, or tremors  SKIN: No itching, burning, rashes, or lesions   MUSCULOSKELETAL: No joint pain or swelling; No muscle, back, or extremity pain    Medications:  MEDICATIONS  (STANDING):  aspirin  chewable 81 milliGRAM(s) Oral daily  ciprofloxacin   IVPB 400 milliGRAM(s) IV Intermittent every 12 hours  gabapentin 600 milliGRAM(s) Oral three times a day  metoprolol succinate ER 25 milliGRAM(s) Oral daily  metroNIDAZOLE  IVPB 500 milliGRAM(s) IV Intermittent every 8 hours  pantoprazole    Tablet 40 milliGRAM(s) Oral before breakfast    MEDICATIONS  (PRN):  acetaminophen   Tablet. 650 milliGRAM(s) Oral every 6 hours PRN Mild Pain (1 - 3)  morphine  - Injectable 2 milliGRAM(s) IV Push every 4 hours PRN moderate to severe pain    	    PHYSICAL EXAM:  T(C): 36.3 (05-03-18 @ 05:38), Max: 36.9 (05-02-18 @ 16:14)  HR: 63 (05-03-18 @ 09:24) (59 - 72)  BP: 109/65 (05-03-18 @ 09:24) (109/65 - 140/82)  RR: 17 (05-03-18 @ 09:24) (16 - 18)  SpO2: 99% (05-03-18 @ 09:24) (97% - 99%)  Wt(kg): --  I&O's Summary      Appearance: Normal	  HEENT:   NCAT, PERRL, EOMI	  Lymphatic: No lymphadenopathy  Cardiovascular: Normal S1 S2, RRR  Respiratory: Lungs clear to auscultation BL  Psychiatry: A & O x 3, Mood & affect appropriate  Gastrointestinal:  Soft, +tender, + BS  Skin: No rashes, No ecchymoses, No cyanosis	  Neurologic: Non-focal  Extremities: Normal range of motion, No clubbing, cyanosis or edema    	  LABS:	 	    CARDIAC MARKERS:                                14.3   8.79  )-----------( 191      ( 03 May 2018 05:35 )             43.6     05-03    139  |  103  |  9   ----------------------------<  98  4.5   |  26  |  1.07    Ca    8.8      03 May 2018 05:35  Phos  3.3     05-03  Mg     1.9     05-03    TPro  6.0  /  Alb  3.7  /  TBili  1.5<H>  /  DBili  x   /  AST  14  /  ALT  15  /  AlkPhos  62  05-03    proBNP:   Lipid Profile:   HgA1c:   TSH:
CHIEF COMPLAINT:Patient is a 48y old  Male who presents with a chief complaint of abdominal pain (02 May 2018 23:52)  feeling better    Allergies:  No Known Allergies      PAST MEDICAL & SURGICAL HISTORY:  Coronary artery disease involving native coronary artery of native heart without angina pectoris  GERD (gastroesophageal reflux disease)  HTN (hypertension)  Sciatic Nerve Disease  S/P transurethral resection of prostate: 2011      FAMILY HISTORY:  No pertinent family history in first degree relatives      REVIEW OF SYSTEMS:  CONSTITUTIONAL: No fever, weight loss, or fatigue  EYES: No eye pain, visual disturbances, or discharge  NECK: No pain or stiffness  RESPIRATORY: No cough or wheezing, no shortness of breath  CARDIOVASCULAR: No chest pain, palpitations, dizziness, or leg swelling  GASTROINTESTINAL: less abd pain. No nausea, vomiting, diarrhea or constipation, no BRBPR  GENITOURINARY: No dysuria, urinary frequency or urgency, no hematuria  NEUROLOGICAL: No headaches, memory loss, loss of strength, numbness, or tremors  SKIN: No itching, burning, rashes, or lesions   MUSCULOSKELETAL: No joint pain or swelling; No muscle, back, or extremity pain      Medications:  MEDICATIONS  (STANDING):  aspirin  chewable 81 milliGRAM(s) Oral daily  ciprofloxacin   IVPB 400 milliGRAM(s) IV Intermittent every 12 hours  gabapentin 600 milliGRAM(s) Oral three times a day  metoprolol succinate ER 25 milliGRAM(s) Oral daily  metroNIDAZOLE  IVPB 500 milliGRAM(s) IV Intermittent every 8 hours  pantoprazole    Tablet 40 milliGRAM(s) Oral before breakfast    MEDICATIONS  (PRN):  acetaminophen   Tablet. 650 milliGRAM(s) Oral every 6 hours PRN Mild Pain (1 - 3)  morphine  - Injectable 2 milliGRAM(s) IV Push every 4 hours PRN moderate to severe pain    	    PHYSICAL EXAM:  T(C): 36.7 (05-04-18 @ 05:36), Max: 36.7 (05-03-18 @ 20:53)  HR: 59 (05-04-18 @ 05:36) (59 - 62)  BP: 93/64 (05-04-18 @ 05:36) (93/64 - 112/77)  RR: 16 (05-04-18 @ 05:36) (16 - 17)  SpO2: 100% (05-04-18 @ 05:36) (98% - 100%)  Wt(kg): --  I&O's Summary    Appearance: Normal	  HEENT:   NCAT, PERRL, EOMI	  Lymphatic: No lymphadenopathy  Cardiovascular: Normal S1 S2, RRR  Respiratory: Lungs clear to auscultation BL  Psychiatry: A & O x 3, Mood & affect appropriate  Gastrointestinal:  Soft, +tender, + BS  Skin: No rashes, No ecchymoses, No cyanosis	  Neurologic: Non-focal  Extremities: Normal range of motion, No clubbing, cyanosis or edema    LABS:	 	    CARDIAC MARKERS:                                14.6   6.54  )-----------( 201      ( 04 May 2018 05:35 )             45.5     05-04    140  |  102  |  8   ----------------------------<  98  4.3   |  25  |  1.11    Ca    8.8      04 May 2018 05:35  Phos  3.3     05-03  Mg     1.9     05-03    TPro  6.1  /  Alb  3.7  /  TBili  1.5<H>  /  DBili  x   /  AST  13  /  ALT  13  /  AlkPhos  62  05-04    proBNP:   Lipid Profile:   HgA1c:   TSH:
Arm band on/IV intact

## 2018-05-30 NOTE — ED ADULT NURSE REASSESSMENT NOTE - NS ED NURSE REASSESS COMMENT FT1
Pt rec'd from [facility]. AxOx4. No acute distress. Medications given for N/V and pain. IVF initiated. Family member at bedside. Awaiting to give report to [facility]. Safety and comfort maintained.

## 2018-05-30 NOTE — ED ADULT NURSE NOTE - PMH
Kidney stones    Type 2 diabetes mellitus without complication, without long-term current use of insulin

## 2018-05-30 NOTE — ED ADULT TRIAGE NOTE - CHIEF COMPLAINT QUOTE
Patient presents with flank pain and hematuria for the past 3 days. Patient reports nausea this morning. History of kidney stones.

## 2018-05-30 NOTE — ED PROVIDER NOTE - MUSCULOSKELETAL, MLM
Spine appears normal, range of motion is not limited, no muscle or joint tenderness. CVA tenderness.

## 2018-05-30 NOTE — ED ADULT NURSE NOTE - OBJECTIVE STATEMENT
C/O right flank pain with nausea and hematuria x 2 days. Denies fever, vomiting. Hx of kidney stones x 1 year ago. Patient reports she took Advil around 3 am today.

## 2018-05-30 NOTE — ED PROVIDER NOTE - OBJECTIVE STATEMENT
57 y/o F with PMHx of kidney stones and DM presents to the ED c/o right flank pain. +n/v/d/f, chills. Denies abd pain, dysuria. Sx similar to prior kidney stones.

## 2018-05-30 NOTE — ED ADULT NURSE REASSESSMENT NOTE - NS ED NURSE REASSESS COMMENT FT1
pt asleep in stretcher "feels tired", arousable by sound. pt reports improvement of pain level since pain medication was provided. vital signs as charted. will continue to monitor.

## 2018-05-30 NOTE — H&P ADULT - HISTORY OF PRESENT ILLNESS
59 yo WF ho Nephrolithiasis 1 year ago, presented to the ED for Right flank pain since yesterday; pain is 8/10 intensity and radiates to RLQ; she also feels clammy but denies any fever/chills.  -found to have recurrent nephrolithiasis with associated hydronephrosis    PMHx: as above  PSHx: C section, TKR  SocHx: no smoking, no etoh  FamHx; mother had kidney stones            REVIEW OF SYSTEMS:    CONSTITUTIONAL: No weakness, No fevers or chills  ENT: No ear ache, No sorethroat  NECK: No pain, No stiffness  RESPIRATORY: No cough, No wheezing, No hemoptysis; No dyspnea  CARDIOVASCULAR: No chest pain, No palpitations  GASTROINTESTINAL: No abd pain, No nausea, No vomiting, No hematemesis, No diarrhea or constipation. No melena, No hematochezia.  GENITOURINARY: +dysuria, +Rt flank pain, +hematuria  NEUROLOGICAL: No diplopia, No paresthesia, No motor dysfunction  MUSCULOSKELETAL: No arthralgia, No myalgia  SKIN: No rashes, or lesions   PSYCH: no anxiety, no suicidal ideation    All other review of systems is negative unless indicated above    Vital Signs Last 24 Hrs  T(C): 36.4 (30 May 2018 15:16), Max: 36.8 (30 May 2018 08:38)  T(F): 97.5 (30 May 2018 15:16), Max: 98.3 (30 May 2018 08:38)  HR: 73 (30 May 2018 15:16) (73 - 83)  BP: 118/70 (30 May 2018 15:16) (118/70 - 125/77)  RR: 16 (30 May 2018 15:16) (16 - 20)  SpO2: 100% (30 May 2018 15:16) (100% - 100%)    PHYSICAL EXAM:    GENERAL: NAD, Well nourished  HEENT:  NC/AT, EOMI, PERRLA, No scleral icterus, Moist mucous membranes  NECK: Supple, No JVD  CNS:  Alert & Oriented X3, Motor Strength 5/5 B/L upper and lower extremities; DTRs 2+ intact   LUNG: Normal Breath sounds, Clear to auscultation bilaterally, No rales, No rhonchi, No wheezing  HEART: RRR; No murmurs, No rubs  ABDOMEN: +BS, ST/ND/NT  GENITOURINARY: Voiding, Bladder not distended, +Rt CVA tenderness  EXTREMITIES:  2+ Peripheral Pulses, No clubbing, No cyanosis, No tibial edema  MUSCULOSKELTAL: Joints normal ROM, No TTP, No effusion  VAGINAL: deferred  SKIN: no rashes  RECTAL: deferred, not indicated  BREAST: deferred                          12.7   11.17 )-----------( 328      ( 30 May 2018 08:39 )             39.1     05-30    134<L>  |  102  |  12  ----------------------------<  261<H>  4.0   |  25  |  0.93    Ca    8.9      30 May 2018 08:39    TPro  8.3  /  Alb  3.9  /  TBili  0.4  /  DBili  x   /  AST  21  /  ALT  33  /  AlkPhos  91  05-30    Vancomycin levels:   Cultures:     MEDICATIONS  (STANDING):  sodium chloride 0.9%. 1000 milliLiter(s) (150 mL/Hr) IV Continuous <Continuous>    MEDICATIONS  (PRN):  acetaminophen   Tablet 650 milliGRAM(s) Oral every 6 hours PRN For Temp greater than 38 C (100.4 F)  morphine  - Injectable 2 milliGRAM(s) IV Push every 4 hours PRN Moderate Pain (4 - 6)  ondansetron Injectable 4 milliGRAM(s) IV Push every 6 hours PRN Nausea      all labs reviewed  all imaging reviewed    Assessment and Plan:    1. Rt obstructive nephrolithiasis:   evaluation  IV hydration :NS at 150cc/hr  U/A : no pyuria, will hold off on abx for now  Antiemetics, Analgesia prn  Admit to med-surg floor  DVT prophy 59 yo WF ho Rt Nephrolithiasis 1 year ago, presented to the ED for Right flank pain since yesterday; pain is 8/10 intensity and radiates to RLQ; she also feels clammy but denies any fever/chills. She also has had associated nausea and vomiting; decreased oral intake  -found to have recurrent nephrolithiasis with associated hydronephrosis    PMHx: as above  PSHx: C section, TKR  SocHx: no smoking, no etoh  FamHx; mother had kidney stones            REVIEW OF SYSTEMS:    CONSTITUTIONAL: No weakness, No fevers or chills  ENT: No ear ache, No sorethroat  NECK: No pain, No stiffness  RESPIRATORY: No cough, No wheezing, No hemoptysis; No dyspnea  CARDIOVASCULAR: No chest pain, No palpitations  GASTROINTESTINAL: No abd pain, No nausea, No vomiting, No hematemesis, No diarrhea or constipation. No melena, No hematochezia.  GENITOURINARY: +dysuria, +Rt flank pain, +hematuria  NEUROLOGICAL: No diplopia, No paresthesia, No motor dysfunction  MUSCULOSKELETAL: No arthralgia, No myalgia  SKIN: No rashes, or lesions   PSYCH: no anxiety, no suicidal ideation    All other review of systems is negative unless indicated above    Vital Signs Last 24 Hrs  T(C): 36.4 (30 May 2018 15:16), Max: 36.8 (30 May 2018 08:38)  T(F): 97.5 (30 May 2018 15:16), Max: 98.3 (30 May 2018 08:38)  HR: 73 (30 May 2018 15:16) (73 - 83)  BP: 118/70 (30 May 2018 15:16) (118/70 - 125/77)  RR: 16 (30 May 2018 15:16) (16 - 20)  SpO2: 100% (30 May 2018 15:16) (100% - 100%)    PHYSICAL EXAM:    GENERAL: NAD, Well nourished  HEENT:  NC/AT, EOMI, PERRLA, No scleral icterus, Moist mucous membranes  NECK: Supple, No JVD  CNS:  Alert & Oriented X3, Motor Strength 5/5 B/L upper and lower extremities; DTRs 2+ intact   LUNG: Normal Breath sounds, Clear to auscultation bilaterally, No rales, No rhonchi, No wheezing  HEART: RRR; No murmurs, No rubs  ABDOMEN: +BS, ST/ND/NT  GENITOURINARY: Voiding, Bladder not distended, +Rt CVA tenderness  EXTREMITIES:  2+ Peripheral Pulses, No clubbing, No cyanosis, No tibial edema  MUSCULOSKELTAL: Joints normal ROM, No TTP, No effusion  VAGINAL: deferred  SKIN: no rashes  RECTAL: deferred, not indicated  BREAST: deferred                          12.7   11.17 )-----------( 328      ( 30 May 2018 08:39 )             39.1     05-30    134<L>  |  102  |  12  ----------------------------<  261<H>  4.0   |  25  |  0.93    Ca    8.9      30 May 2018 08:39    TPro  8.3  /  Alb  3.9  /  TBili  0.4  /  DBili  x   /  AST  21  /  ALT  33  /  AlkPhos  91  05-30    Vancomycin levels:   Cultures:     MEDICATIONS  (STANDING):  sodium chloride 0.9%. 1000 milliLiter(s) (150 mL/Hr) IV Continuous <Continuous>    MEDICATIONS  (PRN):  acetaminophen   Tablet 650 milliGRAM(s) Oral every 6 hours PRN For Temp greater than 38 C (100.4 F)  morphine  - Injectable 2 milliGRAM(s) IV Push every 4 hours PRN Moderate Pain (4 - 6)  ondansetron Injectable 4 milliGRAM(s) IV Push every 6 hours PRN Nausea      all labs reviewed  all imaging reviewed    Assessment and Plan:    1. Rt obstructive nephrolithiasis:   evaluation  IV hydration :NS at 150cc/hr  U/A : no pyuria, will hold off on abx for now  Antiemetics, Analgesia prn  Admit to med-surg floor  DVT prophy 57 yo WF ho Rt Nephrolithiasis 1 year ago, Prediabetes, Uti,  presented to the ED for Right flank pain since yesterday; pain is 8/10 intensity and radiates to RLQ; she also feels clammy but denies any fever/chills. She also has had associated nausea and vomiting; decreased oral intake  -found to have recurrent nephrolithiasis with associated hydronephrosis    PMHx: as above  PSHx: C section, TKR  SocHx: no smoking, no etoh  FamHx; mother had kidney stones            REVIEW OF SYSTEMS:    CONSTITUTIONAL: No weakness, No fevers or chills  ENT: No ear ache, No sorethroat  NECK: No pain, No stiffness  RESPIRATORY: No cough, No wheezing, No hemoptysis; No dyspnea  CARDIOVASCULAR: No chest pain, No palpitations  GASTROINTESTINAL: No abd pain, No nausea, No vomiting, No hematemesis, No diarrhea or constipation. No melena, No hematochezia.  GENITOURINARY: +dysuria, +Rt flank pain, +hematuria  NEUROLOGICAL: No diplopia, No paresthesia, No motor dysfunction  MUSCULOSKELETAL: No arthralgia, No myalgia  SKIN: No rashes, or lesions   PSYCH: no anxiety, no suicidal ideation    All other review of systems is negative unless indicated above    Vital Signs Last 24 Hrs  T(C): 36.4 (30 May 2018 15:16), Max: 36.8 (30 May 2018 08:38)  T(F): 97.5 (30 May 2018 15:16), Max: 98.3 (30 May 2018 08:38)  HR: 73 (30 May 2018 15:16) (73 - 83)  BP: 118/70 (30 May 2018 15:16) (118/70 - 125/77)  RR: 16 (30 May 2018 15:16) (16 - 20)  SpO2: 100% (30 May 2018 15:16) (100% - 100%)    PHYSICAL EXAM:    GENERAL: NAD, Well nourished  HEENT:  NC/AT, EOMI, PERRLA, No scleral icterus, Moist mucous membranes  NECK: Supple, No JVD  CNS:  Alert & Oriented X3, Motor Strength 5/5 B/L upper and lower extremities; DTRs 2+ intact   LUNG: Normal Breath sounds, Clear to auscultation bilaterally, No rales, No rhonchi, No wheezing  HEART: RRR; No murmurs, No rubs  ABDOMEN: +BS, ST/ND/NT  GENITOURINARY: Voiding, Bladder not distended, +Rt CVA tenderness  EXTREMITIES:  2+ Peripheral Pulses, No clubbing, No cyanosis, No tibial edema  MUSCULOSKELTAL: Joints normal ROM, No TTP, No effusion  VAGINAL: deferred  SKIN: no rashes  RECTAL: deferred, not indicated  BREAST: deferred                          12.7   11.17 )-----------( 328      ( 30 May 2018 08:39 )             39.1     05-30    134<L>  |  102  |  12  ----------------------------<  261<H>  4.0   |  25  |  0.93    Ca    8.9      30 May 2018 08:39    TPro  8.3  /  Alb  3.9  /  TBili  0.4  /  DBili  x   /  AST  21  /  ALT  33  /  AlkPhos  91  05-30    Vancomycin levels:   Cultures:     MEDICATIONS  (STANDING):  sodium chloride 0.9%. 1000 milliLiter(s) (150 mL/Hr) IV Continuous <Continuous>    MEDICATIONS  (PRN):  acetaminophen   Tablet 650 milliGRAM(s) Oral every 6 hours PRN For Temp greater than 38 C (100.4 F)  morphine  - Injectable 2 milliGRAM(s) IV Push every 4 hours PRN Moderate Pain (4 - 6)  ondansetron Injectable 4 milliGRAM(s) IV Push every 6 hours PRN Nausea      all labs reviewed  all imaging reviewed    Assessment and Plan:    1. Rt obstructive nephrolithiasis:   evaluation  IV hydration :NS at 150cc/hr  Antiemetics, Analgesia prn  Admit to med-surg floor  DVT prophy    2. Prediabetes:  worsened by acute medical issues  HbA1c  Novolog SS  ADA    3. Leukocytosis:  r/o early Uti  Ceftriaxone 1gm daily x 3 days

## 2018-05-31 DIAGNOSIS — N20.0 CALCULUS OF KIDNEY: ICD-10-CM

## 2018-05-31 LAB
ANION GAP SERPL CALC-SCNC: 6 MMOL/L — SIGNIFICANT CHANGE UP (ref 5–17)
BUN SERPL-MCNC: 10 MG/DL — SIGNIFICANT CHANGE UP (ref 7–23)
CALCIUM SERPL-MCNC: 8.5 MG/DL — SIGNIFICANT CHANGE UP (ref 8.5–10.1)
CHLORIDE SERPL-SCNC: 108 MMOL/L — SIGNIFICANT CHANGE UP (ref 96–108)
CO2 SERPL-SCNC: 26 MMOL/L — SIGNIFICANT CHANGE UP (ref 22–31)
CREAT SERPL-MCNC: 1 MG/DL — SIGNIFICANT CHANGE UP (ref 0.5–1.3)
CULTURE RESULTS: SIGNIFICANT CHANGE UP
GLUCOSE BLDC GLUCOMTR-MCNC: 115 MG/DL — HIGH (ref 70–99)
GLUCOSE BLDC GLUCOMTR-MCNC: 117 MG/DL — HIGH (ref 70–99)
GLUCOSE BLDC GLUCOMTR-MCNC: 143 MG/DL — HIGH (ref 70–99)
GLUCOSE BLDC GLUCOMTR-MCNC: 190 MG/DL — HIGH (ref 70–99)
GLUCOSE SERPL-MCNC: 122 MG/DL — HIGH (ref 70–99)
HBA1C BLD-MCNC: 7.4 % — HIGH (ref 4–5.6)
HCT VFR BLD CALC: 36.7 % — SIGNIFICANT CHANGE UP (ref 34.5–45)
HGB BLD-MCNC: 12 G/DL — SIGNIFICANT CHANGE UP (ref 11.5–15.5)
MCHC RBC-ENTMCNC: 26.7 PG — LOW (ref 27–34)
MCHC RBC-ENTMCNC: 32.7 GM/DL — SIGNIFICANT CHANGE UP (ref 32–36)
MCV RBC AUTO: 81.7 FL — SIGNIFICANT CHANGE UP (ref 80–100)
NRBC # BLD: 0 /100 WBCS — SIGNIFICANT CHANGE UP (ref 0–0)
PLATELET # BLD AUTO: 301 K/UL — SIGNIFICANT CHANGE UP (ref 150–400)
POTASSIUM SERPL-MCNC: 3.6 MMOL/L — SIGNIFICANT CHANGE UP (ref 3.5–5.3)
POTASSIUM SERPL-SCNC: 3.6 MMOL/L — SIGNIFICANT CHANGE UP (ref 3.5–5.3)
RBC # BLD: 4.49 M/UL — SIGNIFICANT CHANGE UP (ref 3.8–5.2)
RBC # FLD: 13.8 % — SIGNIFICANT CHANGE UP (ref 10.3–14.5)
SODIUM SERPL-SCNC: 140 MMOL/L — SIGNIFICANT CHANGE UP (ref 135–145)
SPECIMEN SOURCE: SIGNIFICANT CHANGE UP
WBC # BLD: 14.32 K/UL — HIGH (ref 3.8–10.5)
WBC # FLD AUTO: 14.32 K/UL — HIGH (ref 3.8–10.5)

## 2018-05-31 RX ORDER — OXYCODONE HYDROCHLORIDE 5 MG/1
10 TABLET ORAL EVERY 4 HOURS
Qty: 0 | Refills: 0 | Status: DISCONTINUED | OUTPATIENT
Start: 2018-05-31 | End: 2018-06-02

## 2018-05-31 RX ADMIN — CEFTRIAXONE 1000 MILLIGRAM(S): 500 INJECTION, POWDER, FOR SOLUTION INTRAMUSCULAR; INTRAVENOUS at 16:57

## 2018-05-31 RX ADMIN — MORPHINE SULFATE 2 MILLIGRAM(S): 50 CAPSULE, EXTENDED RELEASE ORAL at 21:43

## 2018-05-31 RX ADMIN — OXYCODONE HYDROCHLORIDE 10 MILLIGRAM(S): 5 TABLET ORAL at 16:57

## 2018-06-01 LAB
ANION GAP SERPL CALC-SCNC: 7 MMOL/L — SIGNIFICANT CHANGE UP (ref 5–17)
BUN SERPL-MCNC: 11 MG/DL — SIGNIFICANT CHANGE UP (ref 7–23)
CALCIUM SERPL-MCNC: 8.4 MG/DL — LOW (ref 8.5–10.1)
CHLORIDE SERPL-SCNC: 107 MMOL/L — SIGNIFICANT CHANGE UP (ref 96–108)
CO2 SERPL-SCNC: 26 MMOL/L — SIGNIFICANT CHANGE UP (ref 22–31)
CREAT SERPL-MCNC: 0.71 MG/DL — SIGNIFICANT CHANGE UP (ref 0.5–1.3)
GLUCOSE BLDC GLUCOMTR-MCNC: 127 MG/DL — HIGH (ref 70–99)
GLUCOSE BLDC GLUCOMTR-MCNC: 128 MG/DL — HIGH (ref 70–99)
GLUCOSE BLDC GLUCOMTR-MCNC: 133 MG/DL — HIGH (ref 70–99)
GLUCOSE BLDC GLUCOMTR-MCNC: 235 MG/DL — HIGH (ref 70–99)
GLUCOSE SERPL-MCNC: 133 MG/DL — HIGH (ref 70–99)
HCT VFR BLD CALC: 34.5 % — SIGNIFICANT CHANGE UP (ref 34.5–45)
HGB BLD-MCNC: 11.2 G/DL — LOW (ref 11.5–15.5)
MCHC RBC-ENTMCNC: 26.7 PG — LOW (ref 27–34)
MCHC RBC-ENTMCNC: 32.5 GM/DL — SIGNIFICANT CHANGE UP (ref 32–36)
MCV RBC AUTO: 82.1 FL — SIGNIFICANT CHANGE UP (ref 80–100)
NRBC # BLD: 0 /100 WBCS — SIGNIFICANT CHANGE UP (ref 0–0)
PLATELET # BLD AUTO: 264 K/UL — SIGNIFICANT CHANGE UP (ref 150–400)
POTASSIUM SERPL-MCNC: 3.7 MMOL/L — SIGNIFICANT CHANGE UP (ref 3.5–5.3)
POTASSIUM SERPL-SCNC: 3.7 MMOL/L — SIGNIFICANT CHANGE UP (ref 3.5–5.3)
RBC # BLD: 4.2 M/UL — SIGNIFICANT CHANGE UP (ref 3.8–5.2)
RBC # FLD: 13.7 % — SIGNIFICANT CHANGE UP (ref 10.3–14.5)
SODIUM SERPL-SCNC: 140 MMOL/L — SIGNIFICANT CHANGE UP (ref 135–145)
WBC # BLD: 9.38 K/UL — SIGNIFICANT CHANGE UP (ref 3.8–10.5)
WBC # FLD AUTO: 9.38 K/UL — SIGNIFICANT CHANGE UP (ref 3.8–10.5)

## 2018-06-01 PROCEDURE — 74018 RADEX ABDOMEN 1 VIEW: CPT | Mod: 26

## 2018-06-01 RX ORDER — OXYCODONE HYDROCHLORIDE 5 MG/1
5 TABLET ORAL EVERY 4 HOURS
Qty: 0 | Refills: 0 | Status: DISCONTINUED | OUTPATIENT
Start: 2018-06-01 | End: 2018-06-01

## 2018-06-01 RX ORDER — FENTANYL CITRATE 50 UG/ML
50 INJECTION INTRAVENOUS
Qty: 0 | Refills: 0 | Status: DISCONTINUED | OUTPATIENT
Start: 2018-06-01 | End: 2018-06-01

## 2018-06-01 RX ORDER — ONDANSETRON 8 MG/1
4 TABLET, FILM COATED ORAL EVERY 6 HOURS
Qty: 0 | Refills: 0 | Status: DISCONTINUED | OUTPATIENT
Start: 2018-06-01 | End: 2018-06-01

## 2018-06-01 RX ORDER — SODIUM CHLORIDE 9 MG/ML
1000 INJECTION, SOLUTION INTRAVENOUS
Qty: 0 | Refills: 0 | Status: DISCONTINUED | OUTPATIENT
Start: 2018-06-01 | End: 2018-06-01

## 2018-06-01 RX ADMIN — SODIUM CHLORIDE 150 MILLILITER(S): 9 INJECTION INTRAMUSCULAR; INTRAVENOUS; SUBCUTANEOUS at 00:00

## 2018-06-01 RX ADMIN — OXYCODONE HYDROCHLORIDE 10 MILLIGRAM(S): 5 TABLET ORAL at 06:36

## 2018-06-01 RX ADMIN — SODIUM CHLORIDE 75 MILLILITER(S): 9 INJECTION, SOLUTION INTRAVENOUS at 17:42

## 2018-06-01 RX ADMIN — Medication 650 MILLIGRAM(S): at 13:54

## 2018-06-01 RX ADMIN — CEFTRIAXONE 1000 MILLIGRAM(S): 500 INJECTION, POWDER, FOR SOLUTION INTRAMUSCULAR; INTRAVENOUS at 18:58

## 2018-06-01 NOTE — PROGRESS NOTE ADULT - SUBJECTIVE AND OBJECTIVE BOX
CHIEF COMPLAINT: right renal colic    HISTORY OF PRESENT ILLNESS:  57 yo WF ho Rt Nephrolithiasis 1 year ago, Prediabetes, Uti,  presented to the ED for Right flank pain since yesterday; pain is 8/10 intensity and radiates to RLQ; she also feels clammy but denies any fever/chills. She also has had associated nausea and vomiting; decreased oral intake  -found to have recurrent nephrolithiasis with associated hydronephrosis    18: Pt prior history cysteine stones last seen by me about one year ago develops right flank pain. Ct reports 2 right ureteral stones with hydro.     PAST MEDICAL & SURGICAL HISTORY:  Kidney stones  Type 2 diabetes mellitus without complication, without long-term current use of insulin  S/P knee replacement  S/P  section      REVIEW OF SYSTEMS:Same previous  MEDICATIONS  (STANDING):  cefTRIAXone Injectable. 1000 milliGRAM(s) IV Push every 24 hours  cefTRIAXone Injectable.      dextrose 5%. 1000 milliLiter(s) (50 mL/Hr) IV Continuous <Continuous>  dextrose 50% Injectable 12.5 Gram(s) IV Push once  dextrose 50% Injectable 25 Gram(s) IV Push once  dextrose 50% Injectable 25 Gram(s) IV Push once  insulin lispro (HumaLOG) corrective regimen sliding scale   SubCutaneous three times a day before meals  sodium chloride 0.9%. 1000 milliLiter(s) (150 mL/Hr) IV Continuous <Continuous>    MEDICATIONS  (PRN):  acetaminophen   Tablet 650 milliGRAM(s) Oral every 6 hours PRN For Temp greater than 38 C (100.4 F)  dextrose 40% Gel 15 Gram(s) Oral once PRN Blood Glucose LESS THAN 70 milliGRAM(s)/deciliter  glucagon  Injectable 1 milliGRAM(s) IntraMuscular once PRN Glucose LESS THAN 70 milligrams/deciliter  morphine  - Injectable 2 milliGRAM(s) IV Push every 4 hours PRN Moderate Pain (4 - 6)  ondansetron Injectable 4 milliGRAM(s) IV Push every 6 hours PRN Nausea  oxyCODONE    IR 10 milliGRAM(s) Oral every 4 hours PRN Severe Pain (7 - 10)      PE:Same Previous    Allergies    penicillins (Hives)    Intolerances        SOCIAL HISTORY:Same previous    FAMILY HISTORY:  No pertinent family history in first degree relatives      Vital Signs Last 24 Hrs  T(C): 36.9 (31 May 2018 11:38), Max: 36.9 (31 May 2018 11:38)  T(F): 98.5 (31 May 2018 11:38), Max: 98.5 (31 May 2018 11:38)  HR: 94 (31 May 2018 11:38) (79 - 94)  BP: 104/61 (31 May 2018 11:38) (104/61 - 140/74)  BP(mean): --  RR: 19 (31 May 2018 11:38) (16 - 19)  SpO2: 97% (31 May 2018 11:38) (97% - 100%)    PHYSICAL EXAM:Same previous    LABS:                        12.0   14.32 )-----------( 301      ( 31 May 2018 06:57 )             36.7     05-31    140  |  108  |  10  ----------------------------<  122<H>  3.6   |  26  |  1.00    Ca    8.5      31 May 2018 06:57    TPro  8.3  /  Alb  3.9  /  TBili  0.4  /  DBili  x   /  AST  21  /  ALT  33  /  AlkPhos  91  05-30    PT/INR - ( 30 May 2018 08:39 )   PT: 10.6 sec;   INR: 0.98 ratio         PTT - ( 30 May 2018 08:39 )  PTT:30.3 sec  Urinalysis Basic - ( 30 May 2018 08:39 )    Color: Yellow / Appearance: Slightly Turbid / S.010 / pH: x  Gluc: x / Ketone: Negative  / Bili: Negative / Urobili: Negative mg/dL   Blood: x / Protein: 30 mg/dL / Nitrite: Negative   Leuk Esterase: Trace / RBC: >50 /HPF / WBC 3-5   Sq Epi: x / Non Sq Epi: Few / Bacteria: Few      Urine Culture:     RADIOLOGY & ADDITIONAL STUDIES:
57 yo WF ho Rt Nephrolithiasis 1 year ago, Prediabetes, Uti,  presented to the ED for Right flank pain since yesterday; pain is 8/10 intensity and radiates to RLQ; she also feels clammy but denies any fever/chills. She also has had associated nausea and vomiting; decreased oral intake  -found to have recurrent nephrolithiasis with associated hydronephrosis    5.31: no pain, no nausea    REVIEW OF SYSTEMS:    CONSTITUTIONAL: No weakness, No fevers or chills  ENT: No ear ache, No sorethroat  NECK: No pain, No stiffness  RESPIRATORY: No cough, No wheezing, No hemoptysis; No dyspnea  CARDIOVASCULAR: No chest pain, No palpitations  GASTROINTESTINAL: No abd pain, No nausea, No vomiting, No hematemesis, No diarrhea or constipation. No melena, No hematochezia.  GENITOURINARY: +dysuria, +Rt flank pain, +hematuria  NEUROLOGICAL: No diplopia, No paresthesia, No motor dysfunction  MUSCULOSKELETAL: No arthralgia, No myalgia  SKIN: No rashes, or lesions   PSYCH: no anxiety, no suicidal ideation    All other review of systems is negative unless indicated above    Vital Signs Last 24 Hrs  T(C): 36.9 (31 May 2018 11:38), Max: 36.9 (31 May 2018 11:38)  T(F): 98.5 (31 May 2018 11:38), Max: 98.5 (31 May 2018 11:38)  HR: 94 (31 May 2018 11:38) (71 - 94)  BP: 104/61 (31 May 2018 11:38) (104/61 - 140/74)  RR: 19 (31 May 2018 11:38) (16 - 19)  SpO2: 97% (31 May 2018 11:38) (97% - 100%)    PHYSICAL EXAM:    GENERAL: NAD, Well nourished  HEENT:  NC/AT, EOMI, PERRLA, No scleral icterus, Moist mucous membranes  NECK: Supple, No JVD  CNS:  Alert & Oriented X3, Motor Strength 5/5 B/L upper and lower extremities; DTRs 2+ intact   LUNG: Normal Breath sounds, Clear to auscultation bilaterally, No rales, No rhonchi, No wheezing  HEART: RRR; No murmurs, No rubs  ABDOMEN: +BS, ST/ND/NT  GENITOURINARY: Voiding, Bladder not distended, no Rt CVA tenderness  EXTREMITIES:  2+ Peripheral Pulses, No clubbing, No cyanosis, No tibial edema  MUSCULOSKELTAL: Joints normal ROM, No TTP, No effusion  SKIN: no rashes  RECTAL: deferred, not indicated  BREAST: deferred               MEDICATIONS  (STANDING):  cefTRIAXone Injectable. 1000 milliGRAM(s) IV Push every 24 hours  insulin lispro (HumaLOG) corrective regimen sliding scale   SubCutaneous three times a day before meals  sodium chloride 0.9%. 1000 milliLiter(s) (150 mL/Hr) IV Continuous <Continuous>      Labs:                        12.0   14.32 )-----------( 301      ( 31 May 2018 06:57 )             36.7     05-31    140  |  108  |  10  ----------------------------<  122<H>  3.6   |  26  |  1.00    Ca    8.5      31 May 2018 06:57    TPro  8.3  /  Alb  3.9  /  TBili  0.4  /  DBili  x   /  AST  21  /  ALT  33  /  AlkPhos  91  05-30      all labs reviewed  all imaging reviewed    Assessment and Plan:    1. Rt obstructive nephrolithiasis:   evaluation  IV hydration :NS at 150cc/hr  Antiemetics, Analgesia prn  Admit to med-surg floor  DVT prophy    2. DM type II:  worsened by acute medical issues  HbA1c 7.4  Novolog   ADA  Diabetic education    3. Leukocytosis:  r/o Uti  Ceftriaxone 1gm daily x 3 days
57 yo WF ho Rt Nephrolithiasis 1 year ago, Prediabetes, Uti,  presented to the ED for Right flank pain since yesterday; pain is 8/10 intensity and radiates to RLQ; she also feels clammy but denies any fever/chills. She also has had associated nausea and vomiting; decreased oral intake  -found to have recurrent nephrolithiasis with associated hydronephrosis    5.31: no pain, no nausea  6.1: recurrent pain in Rt flank    REVIEW OF SYSTEMS:    CONSTITUTIONAL: No weakness, No fevers or chills  ENT: No ear ache, No sorethroat  NECK: No pain, No stiffness  RESPIRATORY: No cough, No wheezing, No hemoptysis; No dyspnea  CARDIOVASCULAR: No chest pain, No palpitations  GASTROINTESTINAL: No abd pain, No nausea, No vomiting, No hematemesis, No diarrhea or constipation. No melena, No hematochezia.  GENITOURINARY: +dysuria, +Rt flank pain, +hematuria  NEUROLOGICAL: No diplopia, No paresthesia, No motor dysfunction  MUSCULOSKELETAL: No arthralgia, No myalgia  SKIN: No rashes, or lesions   PSYCH: no anxiety, no suicidal ideation    All other review of systems is negative unless indicated above    Vital Signs Last 24 Hrs  T(C): 36.7 (01 Jun 2018 16:18), Max: 37.1 (31 May 2018 21:10)  T(F): 98 (01 Jun 2018 16:18), Max: 98.7 (31 May 2018 21:10)  HR: 76 (01 Jun 2018 16:18) (76 - 97)  BP: 124/62 (01 Jun 2018 16:18) (115/72 - 128/69)  BP(mean): --  RR: 16 (01 Jun 2018 16:18) (16 - 19)  SpO2: 99% (01 Jun 2018 16:18) (97% - 99%)    PHYSICAL EXAM:    GENERAL: NAD, Well nourished  HEENT:  NC/AT, EOMI, PERRLA, No scleral icterus, Moist mucous membranes  NECK: Supple, No JVD  CNS:  Alert & Oriented X3, Motor Strength 5/5 B/L upper and lower extremities; DTRs 2+ intact   LUNG: Normal Breath sounds, Clear to auscultation bilaterally, No rales, No rhonchi, No wheezing  HEART: RRR; No murmurs, No rubs  ABDOMEN: +BS, ST/ND/NT  GENITOURINARY: Voiding, Bladder not distended, + Rt CVA tenderness  EXTREMITIES:  2+ Peripheral Pulses, No clubbing, No cyanosis, No tibial edema  MUSCULOSKELTAL: Joints normal ROM, No TTP, No effusion  SKIN: no rashes  RECTAL: deferred, not indicated  BREAST: deferred               MEDICATIONS  (STANDING):  cefTRIAXone Injectable. 1000 milliGRAM(s) IV Push every 24 hours  insulin lispro (HumaLOG) corrective regimen sliding scale   SubCutaneous three times a day before meals  sodium chloride 0.9%. 1000 milliLiter(s) (150 mL/Hr) IV Continuous <Continuous>      Labs:                        11.2   9.38  )-----------( 264      ( 01 Jun 2018 07:26 )             34.5     06-01    140  |  107  |  11  ----------------------------<  133<H>  3.7   |  26  |  0.71    Ca    8.4<L>      01 Jun 2018 07:26             Cultures:       all labs reviewed  all imaging reviewed    Assessment and Plan:    1. Rt obstructive nephrolithiasis:   evaluation for cystoscopy and JJ stent placement +/- lithotripsy   IV hydration :NS at 150cc/hr  Antiemetics, Analgesia prn  DVT prophy    2. DM type II:  worsened by acute medical issues  HbA1c 7.4  Novolog SS  ADA  Diabetic education    3. Leukocytosis:  r/o Uti  Ceftriaxone 1gm daily x 3 days

## 2018-06-02 ENCOUNTER — TRANSCRIPTION ENCOUNTER (OUTPATIENT)
Age: 58
End: 2018-06-02

## 2018-06-02 VITALS
DIASTOLIC BLOOD PRESSURE: 64 MMHG | SYSTOLIC BLOOD PRESSURE: 105 MMHG | HEART RATE: 84 BPM | TEMPERATURE: 98 F | RESPIRATION RATE: 19 BRPM | OXYGEN SATURATION: 98 %

## 2018-06-02 LAB
GLUCOSE BLDC GLUCOMTR-MCNC: 162 MG/DL — HIGH (ref 70–99)
GLUCOSE BLDC GLUCOMTR-MCNC: 232 MG/DL — HIGH (ref 70–99)

## 2018-06-02 RX ORDER — ACETAMINOPHEN 500 MG
2 TABLET ORAL
Qty: 0 | Refills: 0 | COMMUNITY
Start: 2018-06-02

## 2018-06-02 RX ADMIN — Medication 4: at 11:59

## 2018-06-02 RX ADMIN — Medication 2: at 09:23

## 2018-06-02 NOTE — DISCHARGE NOTE ADULT - CARE PLAN
Principal Discharge DX:	Kidney stones  Goal:	feel well  Assessment and plan of treatment:	drink plenty of fluids, diabetic diet, follow up with endocrinology and urology  Secondary Diagnosis:	Type 2 diabetes mellitus without complication, without long-term current use of insulin

## 2018-06-02 NOTE — DISCHARGE NOTE ADULT - MEDICATION SUMMARY - MEDICATIONS TO TAKE
I will START or STAY ON the medications listed below when I get home from the hospital:    acetaminophen 325 mg oral tablet  -- 2 tab(s) by mouth every 8 hours, As Needed for pain  -- Indication: For pain

## 2018-06-02 NOTE — CHART NOTE - NSCHARTNOTEFT_GEN_A_CORE
Clinical Nutrition BRIEF NOTE    *pt pending d/c today.  received a re-consult for education.  Pt education on 6/1 on the diabetic diet.  writer visited pt and provided additional answers to questions on DM diet.  Pt encouraged to strictly follow therapeutic diet.  Current Diet Rx remains appropriate.

## 2018-06-02 NOTE — DISCHARGE NOTE ADULT - HOSPITAL COURSE
57 yo WF ho Rt Nephrolithiasis 1 year ago, Prediabetes, Uti,  presented to the ED for Right flank pain since yesterday; pain is 8/10 intensity and radiates to RLQ; she also feels clammy but denies any fever/chills. She also has had associated nausea and vomiting; decreased oral intake  -found to have recurrent nephrolithiasis with associated hydronephrosis    5.31: no pain, no nausea  6.1: recurrent pain in Rt flank    REVIEW OF SYSTEMS:    CONSTITUTIONAL: No weakness, No fevers or chills  ENT: No ear ache, No sorethroat  NECK: No pain, No stiffness  RESPIRATORY: No cough, No wheezing, No hemoptysis; No dyspnea  CARDIOVASCULAR: No chest pain, No palpitations  GASTROINTESTINAL: No abd pain, No nausea, No vomiting, No hematemesis, No diarrhea or constipation. No melena, No hematochezia.  GENITOURINARY: +dysuria, +Rt flank pain, +hematuria  NEUROLOGICAL: No diplopia, No paresthesia, No motor dysfunction  MUSCULOSKELETAL: No arthralgia, No myalgia  SKIN: No rashes, or lesions   PSYCH: no anxiety, no suicidal ideation    All other review of systems is negative unless indicated above    Vital Signs Last 24 Hrs  T(C): 36.7 (01 Jun 2018 16:18), Max: 37.1 (31 May 2018 21:10)  T(F): 98 (01 Jun 2018 16:18), Max: 98.7 (31 May 2018 21:10)  HR: 76 (01 Jun 2018 16:18) (76 - 97)  BP: 124/62 (01 Jun 2018 16:18) (115/72 - 128/69)  BP(mean): --  RR: 16 (01 Jun 2018 16:18) (16 - 19)  SpO2: 99% (01 Jun 2018 16:18) (97% - 99%)    PHYSICAL EXAM:    GENERAL: NAD, Well nourished  HEENT:  NC/AT, EOMI, PERRLA, No scleral icterus, Moist mucous membranes  NECK: Supple, No JVD  CNS:  Alert & Oriented X3, Motor Strength 5/5 B/L upper and lower extremities; DTRs 2+ intact   LUNG: Normal Breath sounds, Clear to auscultation bilaterally, No rales, No rhonchi, No wheezing  HEART: RRR; No murmurs, No rubs  ABDOMEN: +BS, ST/ND/NT  GENITOURINARY: Voiding, Bladder not distended, + Rt CVA tenderness  EXTREMITIES:  2+ Peripheral Pulses, No clubbing, No cyanosis, No tibial edema  MUSCULOSKELTAL: Joints normal ROM, No TTP, No effusion  SKIN: no rashes  RECTAL: deferred, not indicated  BREAST: deferred    Assessment and Plan:    1. Rt obstructive nephrolithiasis:  -s/p JJ stent placement  drink plenty of fluids  Analgesia prn  Urology follow up as outpt    2. DM type II:  HbA1c 7.4  diabetic diet  outpt follow up with endocrinology      d/c planning, time 35min

## 2018-06-02 NOTE — DISCHARGE NOTE ADULT - CARE PROVIDER_API CALL
Chau Vallecillo), Urology  [address] [place] [address]  Phone: [phone]  Fax: [phone]    Endocrinology,   Phone: (   )    -  Fax: (   )    -

## 2018-06-02 NOTE — DISCHARGE NOTE ADULT - INSTRUCTIONS
Return to ER or call Md if you experience fever greater than 101, increased pain, decreased urine output. Continue medications as prescribed and follow up with doctors as instructed.

## 2018-06-02 NOTE — DISCHARGE NOTE ADULT - PATIENT PORTAL LINK FT
You can access the [facility] Patient Portal, offered by [facility], by registering with the following website: [email or web address]

## 2018-06-06 DIAGNOSIS — K76.0 FATTY (CHANGE OF) LIVER, NOT ELSEWHERE CLASSIFIED: ICD-10-CM

## 2018-06-06 DIAGNOSIS — Z96.659 PRESENCE OF UNSPECIFIED ARTIFICIAL KNEE JOINT: ICD-10-CM

## 2018-06-06 DIAGNOSIS — N13.2 HYDRONEPHROSIS WITH RENAL AND URETERAL CALCULOUS OBSTRUCTION: ICD-10-CM

## 2018-06-12 PROBLEM — N20.0 CALCULUS OF KIDNEY: Chronic | Status: ACTIVE | Noted: 2018-05-30

## 2018-06-20 ENCOUNTER — APPOINTMENT (OUTPATIENT)
Dept: UROLOGY | Facility: CLINIC | Age: 58
End: 2018-06-20
Payer: COMMERCIAL

## 2018-06-20 VITALS
DIASTOLIC BLOOD PRESSURE: 81 MMHG | HEIGHT: 67 IN | BODY MASS INDEX: 26.68 KG/M2 | SYSTOLIC BLOOD PRESSURE: 124 MMHG | WEIGHT: 170 LBS | HEART RATE: 87 BPM | RESPIRATION RATE: 17 BRPM | TEMPERATURE: 98 F

## 2018-06-20 PROCEDURE — 99203 OFFICE O/P NEW LOW 30 MIN: CPT

## 2018-07-12 ENCOUNTER — OUTPATIENT (OUTPATIENT)
Dept: OUTPATIENT SERVICES | Facility: HOSPITAL | Age: 58
LOS: 1 days | End: 2018-07-12
Payer: COMMERCIAL

## 2018-07-12 VITALS
HEIGHT: 67 IN | OXYGEN SATURATION: 99 % | DIASTOLIC BLOOD PRESSURE: 84 MMHG | WEIGHT: 169.98 LBS | SYSTOLIC BLOOD PRESSURE: 124 MMHG | RESPIRATION RATE: 16 BRPM | HEART RATE: 76 BPM | TEMPERATURE: 98 F

## 2018-07-12 DIAGNOSIS — E11.9 TYPE 2 DIABETES MELLITUS WITHOUT COMPLICATIONS: ICD-10-CM

## 2018-07-12 DIAGNOSIS — Z01.818 ENCOUNTER FOR OTHER PREPROCEDURAL EXAMINATION: ICD-10-CM

## 2018-07-12 DIAGNOSIS — Z96.659 PRESENCE OF UNSPECIFIED ARTIFICIAL KNEE JOINT: Chronic | ICD-10-CM

## 2018-07-12 DIAGNOSIS — N20.1 CALCULUS OF URETER: ICD-10-CM

## 2018-07-12 DIAGNOSIS — Z98.891 HISTORY OF UTERINE SCAR FROM PREVIOUS SURGERY: Chronic | ICD-10-CM

## 2018-07-12 DIAGNOSIS — Z98.51 TUBAL LIGATION STATUS: Chronic | ICD-10-CM

## 2018-07-12 LAB
ANION GAP SERPL CALC-SCNC: 11 MMOL/L — SIGNIFICANT CHANGE UP (ref 5–17)
BUN SERPL-MCNC: 14 MG/DL — SIGNIFICANT CHANGE UP (ref 7–23)
CALCIUM SERPL-MCNC: 9 MG/DL — SIGNIFICANT CHANGE UP (ref 8.4–10.5)
CHLORIDE SERPL-SCNC: 102 MMOL/L — SIGNIFICANT CHANGE UP (ref 96–108)
CO2 SERPL-SCNC: 25 MMOL/L — SIGNIFICANT CHANGE UP (ref 22–31)
CREAT SERPL-MCNC: 0.79 MG/DL — SIGNIFICANT CHANGE UP (ref 0.5–1.3)
GLUCOSE SERPL-MCNC: 150 MG/DL — HIGH (ref 70–99)
HCT VFR BLD CALC: 37 % — SIGNIFICANT CHANGE UP (ref 34.5–45)
HGB BLD-MCNC: 12.1 G/DL — SIGNIFICANT CHANGE UP (ref 11.5–15.5)
MCHC RBC-ENTMCNC: 26.9 PG — LOW (ref 27–34)
MCHC RBC-ENTMCNC: 32.7 GM/DL — SIGNIFICANT CHANGE UP (ref 32–36)
MCV RBC AUTO: 82.2 FL — SIGNIFICANT CHANGE UP (ref 80–100)
PLATELET # BLD AUTO: 314 K/UL — SIGNIFICANT CHANGE UP (ref 150–400)
POTASSIUM SERPL-MCNC: 4 MMOL/L — SIGNIFICANT CHANGE UP (ref 3.5–5.3)
POTASSIUM SERPL-SCNC: 4 MMOL/L — SIGNIFICANT CHANGE UP (ref 3.5–5.3)
RBC # BLD: 4.5 M/UL — SIGNIFICANT CHANGE UP (ref 3.8–5.2)
RBC # FLD: 14.3 % — SIGNIFICANT CHANGE UP (ref 10.3–14.5)
SODIUM SERPL-SCNC: 138 MMOL/L — SIGNIFICANT CHANGE UP (ref 135–145)
WBC # BLD: 8.32 K/UL — SIGNIFICANT CHANGE UP (ref 3.8–10.5)
WBC # FLD AUTO: 8.32 K/UL — SIGNIFICANT CHANGE UP (ref 3.8–10.5)

## 2018-07-12 PROCEDURE — G0463: CPT

## 2018-07-12 PROCEDURE — 85027 COMPLETE CBC AUTOMATED: CPT

## 2018-07-12 PROCEDURE — 80048 BASIC METABOLIC PNL TOTAL CA: CPT

## 2018-07-12 PROCEDURE — 87086 URINE CULTURE/COLONY COUNT: CPT

## 2018-07-12 RX ORDER — LIDOCAINE HCL 20 MG/ML
0.2 VIAL (ML) INJECTION ONCE
Qty: 0 | Refills: 0 | Status: DISCONTINUED | OUTPATIENT
Start: 2018-07-20 | End: 2018-07-20

## 2018-07-12 RX ORDER — SODIUM CHLORIDE 9 MG/ML
3 INJECTION INTRAMUSCULAR; INTRAVENOUS; SUBCUTANEOUS EVERY 8 HOURS
Qty: 0 | Refills: 0 | Status: DISCONTINUED | OUTPATIENT
Start: 2018-07-20 | End: 2018-07-20

## 2018-07-12 NOTE — H&P PST ADULT - HISTORY OF PRESENT ILLNESS
59 Y/O Female H/O Right kidney stones. Pt reports she went to ER [facility] 5/30/18 for right flank pain , hematuria. S/P Cat scan and X-ray kidney. S/P insertion of stent in right kidney. Presents for right ureteroscopy for 2 stones in right ureter and small lower pole stone 7/20/18

## 2018-07-12 NOTE — H&P PST ADULT - NSANTHOSAYNRD_GEN_A_CORE
No. LEE screening performed.  STOP BANG Legend: 0-2 = LOW Risk; 3-4 = INTERMEDIATE Risk; 5-8 = HIGH Risk

## 2018-07-12 NOTE — H&P PST ADULT - PSH
S/P  section    S/P knee replacement H/O tubal ligation    S/P  section    S/P knee replacement  right partial knee replacement

## 2018-07-12 NOTE — H&P PST ADULT - PMH
Kidney stones    Type 2 diabetes mellitus without complication, without long-term current use of insulin Kidney stones    Type 2 diabetes mellitus without complication, without long-term current use of insulin  Pt reports she is pre DM and is trying to conroll it with her diet.

## 2018-07-12 NOTE — H&P PST ADULT - PROBLEM SELECTOR PLAN 1
Right ureteroscopy for 2 stones in right ureter and small lower pole stone.  Check labs, Urine culture

## 2018-07-12 NOTE — H&P PST ADULT - NEGATIVE CARDIOVASCULAR SYMPTOMS
no chest pain/no dyspnea on exertion/no orthopnea/no peripheral edema/no palpitations/no paroxysmal nocturnal dyspnea

## 2018-07-13 PROBLEM — E11.9 TYPE 2 DIABETES MELLITUS WITHOUT COMPLICATIONS: Chronic | Status: ACTIVE | Noted: 2017-05-28

## 2018-07-13 LAB
CULTURE RESULTS: SIGNIFICANT CHANGE UP
SPECIMEN SOURCE: SIGNIFICANT CHANGE UP

## 2018-07-19 ENCOUNTER — TRANSCRIPTION ENCOUNTER (OUTPATIENT)
Age: 58
End: 2018-07-19

## 2018-07-20 ENCOUNTER — OUTPATIENT (OUTPATIENT)
Dept: OUTPATIENT SERVICES | Facility: HOSPITAL | Age: 58
LOS: 1 days | End: 2018-07-20
Payer: COMMERCIAL

## 2018-07-20 ENCOUNTER — APPOINTMENT (OUTPATIENT)
Dept: UROLOGY | Facility: HOSPITAL | Age: 58
End: 2018-07-20

## 2018-07-20 ENCOUNTER — RESULT REVIEW (OUTPATIENT)
Age: 58
End: 2018-07-20

## 2018-07-20 VITALS
HEART RATE: 77 BPM | DIASTOLIC BLOOD PRESSURE: 62 MMHG | OXYGEN SATURATION: 100 % | RESPIRATION RATE: 16 BRPM | SYSTOLIC BLOOD PRESSURE: 128 MMHG | TEMPERATURE: 98 F

## 2018-07-20 VITALS
DIASTOLIC BLOOD PRESSURE: 80 MMHG | RESPIRATION RATE: 18 BRPM | OXYGEN SATURATION: 98 % | HEART RATE: 88 BPM | WEIGHT: 169.98 LBS | TEMPERATURE: 98 F | HEIGHT: 67 IN | SYSTOLIC BLOOD PRESSURE: 123 MMHG

## 2018-07-20 DIAGNOSIS — Z98.51 TUBAL LIGATION STATUS: Chronic | ICD-10-CM

## 2018-07-20 DIAGNOSIS — Z96.659 PRESENCE OF UNSPECIFIED ARTIFICIAL KNEE JOINT: Chronic | ICD-10-CM

## 2018-07-20 DIAGNOSIS — Z98.891 HISTORY OF UTERINE SCAR FROM PREVIOUS SURGERY: Chronic | ICD-10-CM

## 2018-07-20 DIAGNOSIS — N20.1 CALCULUS OF URETER: ICD-10-CM

## 2018-07-20 LAB — GLUCOSE BLDC GLUCOMTR-MCNC: 139 MG/DL — HIGH (ref 70–99)

## 2018-07-20 PROCEDURE — 52332 CYSTOSCOPY AND TREATMENT: CPT | Mod: RT,XS

## 2018-07-20 PROCEDURE — 76000 FLUOROSCOPY <1 HR PHYS/QHP: CPT

## 2018-07-20 PROCEDURE — 82365 CALCULUS SPECTROSCOPY: CPT

## 2018-07-20 PROCEDURE — C1889: CPT

## 2018-07-20 PROCEDURE — 74420 UROGRAPHY RTRGR +-KUB: CPT | Mod: 26

## 2018-07-20 PROCEDURE — 52352 CYSTOURETERO W/STONE REMOVE: CPT | Mod: LT

## 2018-07-20 PROCEDURE — 88300 SURGICAL PATH GROSS: CPT | Mod: 26

## 2018-07-20 PROCEDURE — 88300 SURGICAL PATH GROSS: CPT

## 2018-07-20 PROCEDURE — C1758: CPT

## 2018-07-20 PROCEDURE — C2625: CPT

## 2018-07-20 PROCEDURE — 52332 CYSTOSCOPY AND TREATMENT: CPT | Mod: LT

## 2018-07-20 PROCEDURE — 82962 GLUCOSE BLOOD TEST: CPT

## 2018-07-20 PROCEDURE — C1769: CPT

## 2018-07-20 PROCEDURE — 52352 CYSTOURETERO W/STONE REMOVE: CPT | Mod: RT

## 2018-07-20 RX ORDER — OXYCODONE AND ACETAMINOPHEN 5; 325 MG/1; MG/1
1 TABLET ORAL
Qty: 10 | Refills: 0
Start: 2018-07-20

## 2018-07-20 RX ORDER — TAMSULOSIN HYDROCHLORIDE 0.4 MG/1
1 CAPSULE ORAL
Qty: 7 | Refills: 0
Start: 2018-07-20 | End: 2018-07-26

## 2018-07-20 RX ORDER — SODIUM CHLORIDE 9 MG/ML
1000 INJECTION, SOLUTION INTRAVENOUS
Qty: 0 | Refills: 0 | Status: DISCONTINUED | OUTPATIENT
Start: 2018-07-20 | End: 2018-08-04

## 2018-07-20 RX ADMIN — SODIUM CHLORIDE 75 MILLILITER(S): 9 INJECTION, SOLUTION INTRAVENOUS at 09:15

## 2018-07-20 NOTE — BRIEF OPERATIVE NOTE - PROCEDURE
<<-----Click on this checkbox to enter Procedure Cystoscopy with right ureteroscopy, retrograde pyelography, extraction of urinary calculus, and insertion of ureteral stent  07/20/2018    Active  [id number]

## 2018-07-20 NOTE — ASU DISCHARGE PLAN (ADULT/PEDIATRIC). - NOTIFY
Unable to Urinate/Persistent Nausea and Vomiting/Pain not relieved by Medications/Fever greater than 101/Inability to Tolerate Liquids or Foods

## 2018-07-20 NOTE — ASU DISCHARGE PLAN (ADULT/PEDIATRIC). - MEDICATION SUMMARY - MEDICATIONS TO TAKE
I will START or STAY ON the medications listed below when I get home from the hospital:    Percocet 5/325 oral tablet  -- 1 tab(s) by mouth every 6 hours, As Needed -for moderate pain - for severe pain MDD:4 tabs   -- Caution federal law prohibits the transfer of this drug to any person other  than the person for whom it was prescribed.  May cause drowsiness.  Alcohol may intensify this effect.  Use care when operating dangerous machinery.  This prescription cannot be refilled.  This product contains acetaminophen.  Do not use  with any other product containing acetaminophen to prevent possible liver damage.  Using more of this medication than prescribed may cause serious breathing problems.    -- Indication: For pain    Flomax 0.4 mg oral capsule  -- 1 cap(s) by mouth once a day (at bedtime)   -- It is very important that you take or use this exactly as directed.  Do not skip doses or discontinue unless directed by your doctor.  May cause drowsiness.  Alcohol may intensify this effect.  Use care when operating dangerous machinery.  Some non-prescription drugs may aggravate your condition.  Read all labels carefully.  If a warning appears, check with your doctor before taking.  Swallow whole.  Do not crush.  Take with food or milk.    -- Indication: For stent colic    Bactrim  mg-160 mg oral tablet  -- 1 tab(s) by mouth 2 times a day   -- Avoid prolonged or excessive exposure to direct and/or artificial sunlight while taking this medication.  Finish all this medication unless otherwise directed by prescriber.  Medication should be taken with plenty of water.    -- Indication: For prophylaxis

## 2018-07-23 LAB — SURGICAL PATHOLOGY STUDY: SIGNIFICANT CHANGE UP

## 2018-07-25 ENCOUNTER — APPOINTMENT (OUTPATIENT)
Dept: UROLOGY | Facility: CLINIC | Age: 58
End: 2018-07-25
Payer: COMMERCIAL

## 2018-07-25 DIAGNOSIS — N20.1 CALCULUS OF URETER: ICD-10-CM

## 2018-07-25 PROCEDURE — 99213 OFFICE O/P EST LOW 20 MIN: CPT

## 2018-07-27 LAB — COMPN STONE: SIGNIFICANT CHANGE UP

## 2018-08-29 RX ORDER — POTASSIUM CITRATE 10 MEQ/1
10 MEQ TABLET, EXTENDED RELEASE ORAL 3 TIMES DAILY
Qty: 540 | Refills: 3 | Status: ACTIVE | COMMUNITY
Start: 2018-07-25 | End: 1900-01-01

## 2019-01-29 NOTE — ED STATDOCS - ENMT, MLM
Nasal mucosa clear.  Mouth with normal mucosa  Throat has no vesicles, no oropharyngeal exudates and uvula is midline. weight-bearing as tolerated

## 2019-03-01 ENCOUNTER — APPOINTMENT (OUTPATIENT)
Dept: UROLOGY | Facility: CLINIC | Age: 59
End: 2019-03-01

## 2019-07-22 NOTE — ED ADULT TRIAGE NOTE - ESI TRIAGE ACUITY LEVEL, MLM
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 The differential diagnosis includes lipoma, nerve sheath tumor, schwannoma, or liposarcoma. This has demonstrated stability on serial imaging. She has no contraindication to bariatric surgery at this time. We will proceed with a follow up MRI thoracic spine in 1 year. She will return to see me Summer 2020. She was reassured regarding the benign nature of this mass. She understands to notify us with any new symptoms including weakness, numbness/tingling, or gait/balance changes.  I have explained all neurosurgical risks and benefits to the planned approach and the patient wishes to proceed at this time. \par  3

## 2019-10-24 NOTE — ED PROVIDER NOTE - SKIN, MLM
Patient is scheduled for a CPX on 11/1/19. Patient advised by reception staff to come in for fasting lab work 3-7 days prior to their appointment unless they hear back from us stating they do not need lab work. Pt was informed to NOT come in for lab work earlier than the 3-7 day timeframe. Pt was informed to fast for 12 hours, nothing to eat or drink. Please place the orders for lab if needed.   Pt was advised to call prior to coming in for lab work to make sure orders are in.   If lab work is not needed, please send this encounter back to reception pool (P [phone]) in order to contact patient.            Skin normal color for race, warm, dry and intact. No evidence of rash.

## 2020-05-11 NOTE — ED PROVIDER NOTE - CHIEF COMPLAINT
A&Ox4. VSS. No C/O pain. IND: ambulated in halls. CBI running mod/fast rate: clear, some clots during movement. Mod carb diet w/ BG checks. Plan to wean CBI as able and possible discharge tomorrow.   The patient is a 57y Female complaining of flank pain.

## 2022-01-04 NOTE — PATIENT PROFILE ADULT. - TEACHING/LEARNING EDUCATIONAL LEVEL
63 y/o male with PMHx of CAD, DM type 2, HTN, HLD, and Heart Failure who presented to the ED for chest pain X 3 days. Pt states he has a cough over the past 3 days with episodes of chest pain. Pt states he had some nausea and vomiting that has since resolved. Pt denies any fever, chills, or abd pain. Denies any diarrhea. Pt was found to be COVID + this morning. Pt has a pulse ox at home and was concerned that it read 93%. 65 y/o male with PMHx of CAD, DM type 2, HTN, HLD, and Heart Failure who presented to the ED for chest pain X 3 days. Pt states he has a cough over the past 3 days with episodes of chest pain while coughing. Pt states he had some nausea and vomiting that has since resolved. notes vomiting usually posttussive. Pt denies any fever, chills, or abd pain. Denies any diarrhea. Pt was found to be COVID + this morning. Pt has a pulse ox at home and was concerned that it read 93%. [facility]

## 2022-02-16 ENCOUNTER — APPOINTMENT (OUTPATIENT)
Dept: UROLOGY | Facility: CLINIC | Age: 62
End: 2022-02-16
Payer: COMMERCIAL

## 2022-02-16 VITALS
HEART RATE: 83 BPM | DIASTOLIC BLOOD PRESSURE: 69 MMHG | WEIGHT: 181 LBS | BODY MASS INDEX: 28.41 KG/M2 | SYSTOLIC BLOOD PRESSURE: 103 MMHG | HEIGHT: 67 IN

## 2022-02-16 PROCEDURE — 99203 OFFICE O/P NEW LOW 30 MIN: CPT

## 2022-02-16 NOTE — HISTORY OF PRESENT ILLNESS
[FreeTextEntry1] : 61 yo F here to establish care due to nephrolithiasis \par saw Dr. Nicole in the past\par had ureteroscopy with laser lithotripsy 2018\par had cystine stones\par \par at the time started on potassium citrate - still taking it\par has not been in follow up since\par did not pass any stones, has some mild left discomfort sometimes, no fever\par \par plan:\par - continue medical treatment\par - will order new CT\par - next visit with results to discuss plan for treatment or follow up

## 2022-02-16 NOTE — ASSESSMENT
[FreeTextEntry1] : \par plan:\par - continue medical treatment\par - will order new CT\par - next visit with results to discuss plan for treatment or follow up

## 2022-02-25 ENCOUNTER — TRANSCRIPTION ENCOUNTER (OUTPATIENT)
Age: 62
End: 2022-02-25

## 2022-02-25 ENCOUNTER — APPOINTMENT (OUTPATIENT)
Dept: CT IMAGING | Facility: CLINIC | Age: 62
End: 2022-02-25
Payer: COMMERCIAL

## 2022-02-25 ENCOUNTER — OUTPATIENT (OUTPATIENT)
Dept: OUTPATIENT SERVICES | Facility: HOSPITAL | Age: 62
LOS: 1 days | End: 2022-02-25
Payer: COMMERCIAL

## 2022-02-25 DIAGNOSIS — Z96.659 PRESENCE OF UNSPECIFIED ARTIFICIAL KNEE JOINT: Chronic | ICD-10-CM

## 2022-02-25 DIAGNOSIS — Z98.51 TUBAL LIGATION STATUS: Chronic | ICD-10-CM

## 2022-02-25 DIAGNOSIS — N20.0 CALCULUS OF KIDNEY: ICD-10-CM

## 2022-02-25 DIAGNOSIS — Z98.891 HISTORY OF UTERINE SCAR FROM PREVIOUS SURGERY: Chronic | ICD-10-CM

## 2022-02-25 PROCEDURE — 74176 CT ABD & PELVIS W/O CONTRAST: CPT | Mod: 26

## 2022-02-25 PROCEDURE — 74176 CT ABD & PELVIS W/O CONTRAST: CPT

## 2022-03-02 ENCOUNTER — APPOINTMENT (OUTPATIENT)
Dept: UROLOGY | Facility: CLINIC | Age: 62
End: 2022-03-02
Payer: COMMERCIAL

## 2022-03-02 DIAGNOSIS — N20.0 CALCULUS OF KIDNEY: ICD-10-CM

## 2022-03-02 PROCEDURE — 99213 OFFICE O/P EST LOW 20 MIN: CPT

## 2022-03-02 NOTE — HISTORY OF PRESENT ILLNESS
[FreeTextEntry1] : 63 yo F for follow up\par see full notes from previous visit \par \par has been treated with potassium citrate with years\par no follow up since\par new CT - no nephrolithiasis\par \par no need for further work up\par will continue medical treatment\par will schedule next visit with ultrasound\par \par  [None] : None

## 2022-04-16 NOTE — PATIENT PROFILE ADULT. - PHONE #
V/O to increase nitro to 100mcg/min at this time      Estuardo Espinoza RN  04/16/22 1003 [phone]

## 2022-11-22 NOTE — ED ADULT NURSE NOTE - CAS TRG GENERAL AIRWAY, MLM
Patent Bleeding that does not stop/Swelling that gets worse/Pain not relieved by Medications/Fever greater than (need to indicate Fahrenheit or Celsius)/Wound/Surgical Site with redness, or foul smelling discharge or pus/Numbness, tingling, color or temperature change to extremity

## 2023-03-03 ENCOUNTER — APPOINTMENT (OUTPATIENT)
Dept: UROLOGY | Facility: CLINIC | Age: 63
End: 2023-03-03

## 2023-05-08 NOTE — ED ADULT TRIAGE NOTE - NS ED NURSE DIRECT TO ROOM YN
Call patient's daughter Rico Saldaña:  paperwork left at the office to be completed has questions that are related to her care and many other issues that I do not have the answer to. They will need to schedule a visit in order to complete this. We have openings tomorrow and on That they can bring her in or come in themselves to answer questions. They should also know what the questions are on the form as I cannot answer questions that they cannot answer. Yes

## 2023-05-24 ENCOUNTER — APPOINTMENT (OUTPATIENT)
Dept: PEDIATRICS | Facility: CLINIC | Age: 63
End: 2023-05-24

## 2023-07-24 NOTE — DISCHARGE NOTE ADULT - PLAN OF CARE
We'll talk soon!   resolve complete antibiotics, follow up with PCP within in 1 week 4 mm non obstructing right renal stone. on sono from 5/30/17  follow up with Dr. Vallecillo for kidney stone analysis and further management in 1 week diet, exercise, A1C 7.6, follow up with PCP for management , consider starting metformin drink plenty of fluids

## 2023-08-28 NOTE — PATIENT PROFILE ADULT. - BILL OF RIGHTS/ADMISSION INFORMATION PROVIDED TO:
DENYS BASHIR to schedule ED follow up.     Electronically signed by Purvi Estrada on 8/28/23 at 9:38 AM EDT
Pt's wife Marissa Saeed called as pt was seen at [facility] ED, 08/27 Bleeding from right ear +2 more. He needs a follow up.  on call. Please contact.
Wife called back to schedule - offered 8/29 but unable to take it due to scheduling conflict.  Patient scheduled with Jade 9/5/23    Electronically signed by Adrian Alex MA on 8/28/23 at 9:49 AM EDT
Patient

## 2023-09-11 ENCOUNTER — INPATIENT (INPATIENT)
Facility: HOSPITAL | Age: 63
LOS: 1 days | Discharge: HOME | DRG: 249 | End: 2023-09-13
Attending: INTERNAL MEDICINE | Admitting: INTERNAL MEDICINE
Payer: COMMERCIAL

## 2023-09-11 VITALS
RESPIRATION RATE: 15 BRPM | HEART RATE: 94 BPM | HEIGHT: 64 IN | OXYGEN SATURATION: 100 % | TEMPERATURE: 99 F | SYSTOLIC BLOOD PRESSURE: 156 MMHG | WEIGHT: 154.1 LBS | DIASTOLIC BLOOD PRESSURE: 84 MMHG

## 2023-09-11 DIAGNOSIS — Z98.51 TUBAL LIGATION STATUS: Chronic | ICD-10-CM

## 2023-09-11 DIAGNOSIS — R11.2 NAUSEA WITH VOMITING, UNSPECIFIED: ICD-10-CM

## 2023-09-11 DIAGNOSIS — Z98.891 HISTORY OF UTERINE SCAR FROM PREVIOUS SURGERY: Chronic | ICD-10-CM

## 2023-09-11 DIAGNOSIS — Z96.659 PRESENCE OF UNSPECIFIED ARTIFICIAL KNEE JOINT: Chronic | ICD-10-CM

## 2023-09-11 LAB
ALBUMIN SERPL ELPH-MCNC: 4.2 G/DL — SIGNIFICANT CHANGE UP (ref 3.3–5)
ALP SERPL-CCNC: 90 U/L — SIGNIFICANT CHANGE UP (ref 40–120)
ALT FLD-CCNC: 30 U/L — SIGNIFICANT CHANGE UP (ref 12–78)
ANION GAP SERPL CALC-SCNC: 5 MMOL/L — SIGNIFICANT CHANGE UP (ref 5–17)
APPEARANCE UR: ABNORMAL
AST SERPL-CCNC: 16 U/L — SIGNIFICANT CHANGE UP (ref 15–37)
BACTERIA # UR AUTO: ABNORMAL
BASOPHILS # BLD AUTO: 0.03 K/UL — SIGNIFICANT CHANGE UP (ref 0–0.2)
BASOPHILS NFR BLD AUTO: 0.2 % — SIGNIFICANT CHANGE UP (ref 0–2)
BILIRUB SERPL-MCNC: 0.6 MG/DL — SIGNIFICANT CHANGE UP (ref 0.2–1.2)
BILIRUB UR-MCNC: NEGATIVE — SIGNIFICANT CHANGE UP
BLD GP AB SCN SERPL QL: SIGNIFICANT CHANGE UP
BUN SERPL-MCNC: 13 MG/DL — SIGNIFICANT CHANGE UP (ref 7–23)
CALCIUM SERPL-MCNC: 10.1 MG/DL — SIGNIFICANT CHANGE UP (ref 8.5–10.1)
CHLORIDE SERPL-SCNC: 102 MMOL/L — SIGNIFICANT CHANGE UP (ref 96–108)
CO2 SERPL-SCNC: 30 MMOL/L — SIGNIFICANT CHANGE UP (ref 22–31)
COLOR SPEC: YELLOW — SIGNIFICANT CHANGE UP
COMMENT - URINE: SIGNIFICANT CHANGE UP
CREAT SERPL-MCNC: 0.83 MG/DL — SIGNIFICANT CHANGE UP (ref 0.5–1.3)
DIFF PNL FLD: NEGATIVE — SIGNIFICANT CHANGE UP
EGFR: 80 ML/MIN/1.73M2 — SIGNIFICANT CHANGE UP
EOSINOPHIL # BLD AUTO: 0 K/UL — SIGNIFICANT CHANGE UP (ref 0–0.5)
EOSINOPHIL NFR BLD AUTO: 0 % — SIGNIFICANT CHANGE UP (ref 0–6)
EPI CELLS # UR: SIGNIFICANT CHANGE UP
GLUCOSE SERPL-MCNC: 153 MG/DL — HIGH (ref 70–99)
GLUCOSE UR QL: NEGATIVE — SIGNIFICANT CHANGE UP
HCG SERPL-ACNC: 1 MIU/ML — SIGNIFICANT CHANGE UP
HCT VFR BLD CALC: 40.5 % — SIGNIFICANT CHANGE UP (ref 34.5–45)
HGB BLD-MCNC: 13.5 G/DL — SIGNIFICANT CHANGE UP (ref 11.5–15.5)
IMM GRANULOCYTES NFR BLD AUTO: 0.4 % — SIGNIFICANT CHANGE UP (ref 0–0.9)
KETONES UR-MCNC: NEGATIVE — SIGNIFICANT CHANGE UP
LACTATE SERPL-SCNC: 1.4 MMOL/L — SIGNIFICANT CHANGE UP (ref 0.7–2)
LEUKOCYTE ESTERASE UR-ACNC: ABNORMAL
LIDOCAIN IGE QN: 51 U/L — SIGNIFICANT CHANGE UP (ref 13–75)
LYMPHOCYTES # BLD AUTO: 1.65 K/UL — SIGNIFICANT CHANGE UP (ref 1–3.3)
LYMPHOCYTES # BLD AUTO: 10.6 % — LOW (ref 13–44)
MCHC RBC-ENTMCNC: 26.8 PG — LOW (ref 27–34)
MCHC RBC-ENTMCNC: 33.3 GM/DL — SIGNIFICANT CHANGE UP (ref 32–36)
MCV RBC AUTO: 80.5 FL — SIGNIFICANT CHANGE UP (ref 80–100)
MONOCYTES # BLD AUTO: 0.95 K/UL — HIGH (ref 0–0.9)
MONOCYTES NFR BLD AUTO: 6.1 % — SIGNIFICANT CHANGE UP (ref 2–14)
NEUTROPHILS # BLD AUTO: 12.83 K/UL — HIGH (ref 1.8–7.4)
NEUTROPHILS NFR BLD AUTO: 82.7 % — HIGH (ref 43–77)
NITRITE UR-MCNC: NEGATIVE — SIGNIFICANT CHANGE UP
PH UR: 8 — SIGNIFICANT CHANGE UP (ref 5–8)
PLATELET # BLD AUTO: 383 K/UL — SIGNIFICANT CHANGE UP (ref 150–400)
POTASSIUM SERPL-MCNC: 3.7 MMOL/L — SIGNIFICANT CHANGE UP (ref 3.5–5.3)
POTASSIUM SERPL-SCNC: 3.7 MMOL/L — SIGNIFICANT CHANGE UP (ref 3.5–5.3)
PROT SERPL-MCNC: 9.1 GM/DL — HIGH (ref 6–8.3)
PROT UR-MCNC: NEGATIVE — SIGNIFICANT CHANGE UP
RAPID RVP RESULT: SIGNIFICANT CHANGE UP
RBC # BLD: 5.03 M/UL — SIGNIFICANT CHANGE UP (ref 3.8–5.2)
RBC # FLD: 13.7 % — SIGNIFICANT CHANGE UP (ref 10.3–14.5)
RBC CASTS # UR COMP ASSIST: SIGNIFICANT CHANGE UP /HPF (ref 0–4)
SARS-COV-2 RNA SPEC QL NAA+PROBE: SIGNIFICANT CHANGE UP
SODIUM SERPL-SCNC: 137 MMOL/L — SIGNIFICANT CHANGE UP (ref 135–145)
SP GR SPEC: 1.01 — SIGNIFICANT CHANGE UP (ref 1.01–1.02)
TROPONIN I, HIGH SENSITIVITY RESULT: 3.94 NG/L — SIGNIFICANT CHANGE UP
UROBILINOGEN FLD QL: NEGATIVE — SIGNIFICANT CHANGE UP
WBC # BLD: 15.52 K/UL — HIGH (ref 3.8–10.5)
WBC # FLD AUTO: 15.52 K/UL — HIGH (ref 3.8–10.5)
WBC UR QL: ABNORMAL /HPF (ref 0–5)

## 2023-09-11 PROCEDURE — 85027 COMPLETE CBC AUTOMATED: CPT

## 2023-09-11 PROCEDURE — 80053 COMPREHEN METABOLIC PANEL: CPT

## 2023-09-11 PROCEDURE — G0378: CPT

## 2023-09-11 PROCEDURE — 93005 ELECTROCARDIOGRAM TRACING: CPT

## 2023-09-11 PROCEDURE — 80048 BASIC METABOLIC PNL TOTAL CA: CPT

## 2023-09-11 PROCEDURE — 93010 ELECTROCARDIOGRAM REPORT: CPT

## 2023-09-11 PROCEDURE — 74177 CT ABD & PELVIS W/CONTRAST: CPT | Mod: 26,MG

## 2023-09-11 PROCEDURE — 86803 HEPATITIS C AB TEST: CPT

## 2023-09-11 PROCEDURE — 0225U NFCT DS DNA&RNA 21 SARSCOV2: CPT

## 2023-09-11 PROCEDURE — 99497 ADVNCD CARE PLAN 30 MIN: CPT | Mod: 25

## 2023-09-11 PROCEDURE — 99285 EMERGENCY DEPT VISIT HI MDM: CPT

## 2023-09-11 PROCEDURE — 71045 X-RAY EXAM CHEST 1 VIEW: CPT | Mod: 26

## 2023-09-11 PROCEDURE — 84484 ASSAY OF TROPONIN QUANT: CPT

## 2023-09-11 PROCEDURE — 36415 COLL VENOUS BLD VENIPUNCTURE: CPT

## 2023-09-11 PROCEDURE — 99223 1ST HOSP IP/OBS HIGH 75: CPT

## 2023-09-11 PROCEDURE — G1004: CPT

## 2023-09-11 RX ORDER — SODIUM CHLORIDE 9 MG/ML
500 INJECTION INTRAMUSCULAR; INTRAVENOUS; SUBCUTANEOUS ONCE
Refills: 0 | Status: COMPLETED | OUTPATIENT
Start: 2023-09-11 | End: 2023-09-11

## 2023-09-11 RX ORDER — SODIUM CHLORIDE 9 MG/ML
1000 INJECTION INTRAMUSCULAR; INTRAVENOUS; SUBCUTANEOUS
Refills: 0 | Status: COMPLETED | OUTPATIENT
Start: 2023-09-11 | End: 2023-09-11

## 2023-09-11 RX ORDER — ONDANSETRON 8 MG/1
4 TABLET, FILM COATED ORAL ONCE
Refills: 0 | Status: DISCONTINUED | OUTPATIENT
Start: 2023-09-11 | End: 2023-09-13

## 2023-09-11 RX ORDER — INFLUENZA VIRUS VACCINE 15; 15; 15; 15 UG/.5ML; UG/.5ML; UG/.5ML; UG/.5ML
0.5 SUSPENSION INTRAMUSCULAR ONCE
Refills: 0 | Status: DISCONTINUED | OUTPATIENT
Start: 2023-09-11 | End: 2023-09-13

## 2023-09-11 RX ORDER — PROCHLORPERAZINE MALEATE 5 MG
5 TABLET ORAL EVERY 8 HOURS
Refills: 0 | Status: DISCONTINUED | OUTPATIENT
Start: 2023-09-11 | End: 2023-09-11

## 2023-09-11 RX ORDER — KETOROLAC TROMETHAMINE 30 MG/ML
30 SYRINGE (ML) INJECTION ONCE
Refills: 0 | Status: DISCONTINUED | OUTPATIENT
Start: 2023-09-11 | End: 2023-09-11

## 2023-09-11 RX ORDER — FAMOTIDINE 10 MG/ML
20 INJECTION INTRAVENOUS ONCE
Refills: 0 | Status: COMPLETED | OUTPATIENT
Start: 2023-09-11 | End: 2023-09-11

## 2023-09-11 RX ORDER — METOCLOPRAMIDE HCL 10 MG
10 TABLET ORAL ONCE
Refills: 0 | Status: COMPLETED | OUTPATIENT
Start: 2023-09-11 | End: 2023-09-11

## 2023-09-11 RX ORDER — POTASSIUM CITRATE MONOHYDRATE 100 %
20 POWDER (GRAM) MISCELLANEOUS
Refills: 0 | Status: DISCONTINUED | OUTPATIENT
Start: 2023-09-11 | End: 2023-09-11

## 2023-09-11 RX ORDER — POTASSIUM CITRATE MONOHYDRATE 100 %
0 POWDER (GRAM) MISCELLANEOUS
Refills: 0 | DISCHARGE

## 2023-09-11 RX ORDER — PROCHLORPERAZINE MALEATE 5 MG
5 TABLET ORAL EVERY 8 HOURS
Refills: 0 | Status: DISCONTINUED | OUTPATIENT
Start: 2023-09-11 | End: 2023-09-13

## 2023-09-11 RX ORDER — ONDANSETRON 8 MG/1
4 TABLET, FILM COATED ORAL ONCE
Refills: 0 | Status: COMPLETED | OUTPATIENT
Start: 2023-09-11 | End: 2023-09-11

## 2023-09-11 RX ORDER — CEFTRIAXONE 500 MG/1
1000 INJECTION, POWDER, FOR SOLUTION INTRAMUSCULAR; INTRAVENOUS ONCE
Refills: 0 | Status: COMPLETED | OUTPATIENT
Start: 2023-09-11 | End: 2023-09-11

## 2023-09-11 RX ORDER — ONDANSETRON 8 MG/1
4 TABLET, FILM COATED ORAL EVERY 4 HOURS
Refills: 0 | Status: DISCONTINUED | OUTPATIENT
Start: 2023-09-11 | End: 2023-09-13

## 2023-09-11 RX ADMIN — Medication 10 MILLIGRAM(S): at 20:53

## 2023-09-11 RX ADMIN — ONDANSETRON 4 MILLIGRAM(S): 8 TABLET, FILM COATED ORAL at 10:09

## 2023-09-11 RX ADMIN — SODIUM CHLORIDE 500 MILLILITER(S): 9 INJECTION INTRAMUSCULAR; INTRAVENOUS; SUBCUTANEOUS at 11:09

## 2023-09-11 RX ADMIN — CEFTRIAXONE 1000 MILLIGRAM(S): 500 INJECTION, POWDER, FOR SOLUTION INTRAMUSCULAR; INTRAVENOUS at 15:26

## 2023-09-11 RX ADMIN — SODIUM CHLORIDE 500 MILLILITER(S): 9 INJECTION INTRAMUSCULAR; INTRAVENOUS; SUBCUTANEOUS at 10:09

## 2023-09-11 RX ADMIN — SODIUM CHLORIDE 125 MILLILITER(S): 9 INJECTION INTRAMUSCULAR; INTRAVENOUS; SUBCUTANEOUS at 21:05

## 2023-09-11 RX ADMIN — FAMOTIDINE 20 MILLIGRAM(S): 10 INJECTION INTRAVENOUS at 10:09

## 2023-09-11 RX ADMIN — ONDANSETRON 4 MILLIGRAM(S): 8 TABLET, FILM COATED ORAL at 15:27

## 2023-09-11 RX ADMIN — SODIUM CHLORIDE 500 MILLILITER(S): 9 INJECTION INTRAMUSCULAR; INTRAVENOUS; SUBCUTANEOUS at 11:28

## 2023-09-11 RX ADMIN — Medication 30 MILLIGRAM(S): at 12:54

## 2023-09-11 NOTE — ED PROVIDER NOTE - ENMT, MLM
Oropharynx clear. Airway patent, Nasal mucosa clear. Mouth with normal mucosa. Throat has no vesicles, no oropharyngeal exudates and uvula is midline. Oropharynx clear. Airway patent, Nasal mucosa clear. Mouth with mildly dry mucosa. Throat has no vesicles, no oropharyngeal exudates and uvula is midline.

## 2023-09-11 NOTE — ED ADULT NURSE REASSESSMENT NOTE - NS ED NURSE REASSESS COMMENT FT1
Assumed care of patient at 16:30. Patient AxOx4, in no acute distress. Reports nausea with improvement and no further episodes of vomiting since 7AM. Also denies any episodes of diarrhea since yesterday morning (9/10). MD Sr at bedside, RVP swab sent, patient reports epigastric pain, stat EKG ordered and MD reviewed. Troponin drawn and results pending. No new orders received. GI PCR cancelled as patient with no episodes of diarrhea in the past 24 hours. Tolerating small amounts of italian ice. Comfort and safety measures maintained, side rails up. All needs addressed. Updated on plan of care, all questions and concerns addressed. Will continue to monitor.

## 2023-09-11 NOTE — ED PROVIDER NOTE - CONSTITUTIONAL, MLM
normal... Female adult, ill appearing, awake, alert, oriented to person, place, time/situation and in no respiratory distress. Female adult, ill-appearing, awake, alert, oriented to person, place, time/situation and in no respiratory distress.

## 2023-09-11 NOTE — ED ADULT NURSE NOTE - NS ED NURSE LEVEL OF CONSCIOUSNESS ORIENTATION
Remember to:    *Limit per-meal sodium intake to 600mg or less per meal.    *Limit daily fluid intake to 64 ounces or less.    *Weigh every morning and report 2-3# overnight or 5# weekly weight gain.    *Take your medications as prescribed.    *Stay as active as possible.     *Wear your mask, wash your hands, keep your distance.     *Call the CHF clinic with any questions [phone].     Oriented - self; Oriented - place; Oriented - time

## 2023-09-11 NOTE — ED ADULT TRIAGE NOTE - CHIEF COMPLAINT QUOTE
patient brought in by EMS from home c/o vomiting.  patient reports vomiting since early morning yesterday.  notes generalized abdominal discomfort.  some diarrhea yesterday.

## 2023-09-11 NOTE — ED PROVIDER NOTE - OBJECTIVE STATEMENT
64 y/o female with a PMHx of DM, kidney stones presents to the ED c/o abd pain and n/v/d since 4am yesterday. +fevers, +chills, +fatigue. Pt attended a party the day prior to onset of symptoms. Abd pain aching, dull and is nonradiating. BMs are nonbloody. Denies urinary complaints. CP, cough. Allergies: Penicillin. No recent travel. No other complaints at this time. 64 y/o female with a PMHx of DM, kidney stones, BIBEMS from home to the ED c/o abd pain and n/v/d since 4am yesterday. +fevers, +chills, +fatigue. Pt attended a party the day prior to onset of symptoms. Abd pain aching, dull and is non-radiating. BMs are nonbloody. Pt unable to tolerate po d/t persistent N/V.  Denies urinary complaints. CP, cough. Allergies: Penicillin. No recent travel. No other complaints at this time.

## 2023-09-11 NOTE — H&P ADULT - HISTORY OF PRESENT ILLNESS
Pt is a pleasant  62 y/o female with a PMHx of DM, kidney stones, ureteral stent in 2018 who  presents to [place]  ED c/o lower abd pain and n/v/d since 4am yesterday morning.   Pt reported subjective  fevers, chills, fatigue and had 15-16 episodes of vomiting yesterday, with 3-4 loose stools .  She denies dark or bloody stools.  Pt reports vomiting with bilious looking emesis.      Pt reports she went to a party in [place] the night before ( two days ago) onset of symptoms.    She ate chicken marsala and denies knowing anyone else  who was ill, no known sick contacts.   Pt reports 6-7 /10 lower abd pain aching, dull and  non- radiating.  Pt also reports 7/10 epigastric pain.    Pt has severe nausea today and has been unable to eat or drink.   Pt reports burning urination yesterday but  denies urinary complaints now.   Pt reports usually having constipation, with a bowel movement every 3-4 days.   She has been on Ozempic for 4 months , with weight loss of 20 lbs.  She reports her Hg A1C was 6.2 last week.      Pt denies respiratory symptoms, no  cough, no SOB, no HA, no flank pain,  no edema.    Pt reports her last colonoscopy was 19 years ago.       Allergies: Penicillin.

## 2023-09-11 NOTE — ED ADULT NURSE NOTE - OBJECTIVE STATEMENT
Patient presents to the ER with complaints of nausea, vomiting, abdominal pain, chills, and sore throat since yesterday. Patient states her symptoms started yesterday and that she was at a party over the weekend. Patient denies chest pain, fever, shortness of breath, blood in stool, urine or vomit.

## 2023-09-11 NOTE — H&P ADULT - ASSESSMENT
Abd pain  persistent vomiting Pt is admitted w/      Abd pain  Ddx: Gastroenteritis, gastritis , medication side effect to potassium citrate  persistent vomiting  diarrhea  epigastric pain from vomiting?    Hx of Potassium Citrate use for renal stones.   Possible medication induced side effect noted  Hx of DM on Ozempic, last HgA1C 6.2    - note (Pharmaceutical Detail) . Solid formulations of potassium chloride have been associated with upper GI bleeding and small bowel ulceration, stenosis, perforation, and obstruction.  Deaths have been reported rarely.    On the use of oral solid formulations of potassium citrate   Patients prescribed a solid oral formulation of potassium citrate should be advised to discontinue potassium therapy and contact their physician if they experience potential symptoms of upper GI injury such as severe vomiting, abdominal pain, distention, and gastrointestinal bleeding.     - s/p IVF 2 L NS in the ED  - s/p zofran in the ED, then reglan  - will discont potassium citrate   - stool PCR  - GI consult Dr. Elizondo  - DVT proph: ambulation  - Full Code     Pt is admitted w/      Abd pain  Ddx: Gastroenteritis, gastritis , medication side effect to potassium citrate  persistent vomiting  diarrhea  epigastric pain from vomiting?    Hx of Potassium Citrate use for renal stones.   Possible medication induced side effect noted  Hx of DM on Ozempic, last HgA1C 6.2    - note (Pharmaceutical Detail) . Solid formulations of potassium chloride have been associated with upper GI bleeding and small bowel ulceration, stenosis, perforation, and obstruction.  Deaths have been reported rarely.    On the use of oral solid formulations of potassium citrate   Patients prescribed a solid oral formulation of potassium citrate should be advised to discontinue potassium therapy and contact their physician if they experience potential symptoms of upper GI injury such as severe vomiting, abdominal pain, distention, and gastrointestinal bleeding.     - s/p IVF 2 L NS in the ED  - s/p Rocephin 1gm in ED for possible UTI,  hold further ceftriaxone as ua is neg for nitrites and leuk est, pt denies urinary complaints today  - f/u ucx  - s/p zofran in the ED, then reglan  - will discont potassium citrate   - stool PCR  - GI consult Dr. Elizondo  - DVT proph: ambulation  - Full Code

## 2023-09-11 NOTE — ED PROVIDER NOTE - CLINICAL SUMMARY MEDICAL DECISION MAKING FREE TEXT BOX
62 y/o female with a PMHx of DM, kidney stones presents ambulatory to the ED c/o diffuse lower abd pain and n/v/d x1 day. Pt PO intolerant and ill appearing. Pt with mild diffuse abd tenderness. Plan: chest XR, labs including lactate, lipase, urine with culture, IVF, IV Pepcid/Zofran, CT abd pelvis, monitor, observe, reassess. 62 y/o female with a PMHx of DM, kidney stones presents ambulatory to the ED c/o diffuse lower abd pain and n/v/d x1 day. Pt PO intolerant and ill appearing. Pt with mild diffuse abd tenderness.   Plan: chest XR, labs including lactate, lipase, urine with culture, IVF, IV Pepcid/Zofran, CT abd pelvis, monitor, observe, reassess.    15:15, sung Stroud MD:  labs notable for elev. WBc 15.5, normal lipase/lactate.  U/A suggestive of acute UTI: BCs & IV Ceftriaxone ordered.  CT A/P negative. Pt still actively retching despite IVF, IV Zofran/Pepcid.  Pt warrants Medical admission, Dr. Price aware. 62 y/o female with a PMHx of DM, kidney stones presents to the ED c/o diffuse lower abd pain and n/v/d x1 day. Pt PO intolerant and ill appearing. Pt with mild diffuse abd tenderness.   Plan: chest XR, labs including lactate, lipase, urine with culture, IVF, IV Pepcid/Zofran, CT abd/pelvis, monitor, observe, reassess.    15:15, sung Stroud MD:  labs notable for elev. WBc 15.5, normal lipase/lactate.  U/A suggestive of acute UTI: BCs & IV Ceftriaxone ordered.  CT A/P negative. Pt still actively retching despite IVF, IV Zofran/Pepcid.  Pt warrants Medical admission, Dr. Price aware.

## 2023-09-11 NOTE — PATIENT PROFILE ADULT - FALL HARM RISK - HARM RISK INTERVENTIONS

## 2023-09-11 NOTE — ED PROVIDER NOTE - NSICDXPASTMEDICALHX_GEN_ALL_CORE_FT
PAST MEDICAL HISTORY:  Kidney stones     Type 2 diabetes mellitus without complication, without long-term current use of insulin Pt reports she is pre DM and is trying to conroll it with her diet.

## 2023-09-11 NOTE — ED PROVIDER NOTE - CARE PLAN
1 Principal Discharge DX:	Intractable nausea and vomiting  Secondary Diagnosis:	Nausea vomiting and diarrhea  Secondary Diagnosis:	Acute UTI (urinary tract infection)

## 2023-09-11 NOTE — ED ADULT NURSE REASSESSMENT NOTE - NS ED NURSE REASSESS COMMENT FT1
Purposeful proactive rounding completed at this time, patient with c/o nausea, one episode of vomiting after eating an Italian ice. No further episodes since. MD Sr aware, patient reports Zofran did not improve her nausea earlier, new orders received for Reglan 10 mg IV push x 1. Patient reports improvement to nausea and resting comfortably. Denies pain at present, patient updated on admission status and plan of care. All questions addressed and answered. Comfort and safety measures maintained. Will continue to monitor.

## 2023-09-11 NOTE — H&P ADULT - NSHPPHYSICALEXAM_GEN_ALL_CORE
ICU Vital Signs Last 24 Hrs  T(C): 37.1 (11 Sep 2023 18:37), Max: 37.3 (11 Sep 2023 08:13)  T(F): 98.7 (11 Sep 2023 18:37), Max: 99.1 (11 Sep 2023 08:13)  HR: 83 (11 Sep 2023 18:37) (74 - 94)  BP: 152/88 (11 Sep 2023 18:37) (151/87 - 156/84)  BP(mean): 107 (11 Sep 2023 18:37) (106 - 107)    RR: 17 (11 Sep 2023 18:37) (15 - 18)  SpO2: 100% (11 Sep 2023 18:37) (100% - 100%)    O2 Parameters below as of 11 Sep 2023 18:37  Patient On (Oxygen Delivery Method): room air

## 2023-09-11 NOTE — ED PROVIDER NOTE - NSICDXPASTSURGICALHX_GEN_ALL_CORE_FT
PAST SURGICAL HISTORY:  H/O tubal ligation     S/P  section     S/P knee replacement right partial knee replacement

## 2023-09-11 NOTE — ED PROVIDER NOTE - GASTROINTESTINAL, MLM
Abdomen soft, no guarding. Bowel sounds hypoactive normal pitch. Mild diffuse abd tenderness. No rebound.

## 2023-09-12 LAB
ANION GAP SERPL CALC-SCNC: 5 MMOL/L — SIGNIFICANT CHANGE UP (ref 5–17)
BUN SERPL-MCNC: 13 MG/DL — SIGNIFICANT CHANGE UP (ref 7–23)
CALCIUM SERPL-MCNC: 9.4 MG/DL — SIGNIFICANT CHANGE UP (ref 8.5–10.1)
CHLORIDE SERPL-SCNC: 100 MMOL/L — SIGNIFICANT CHANGE UP (ref 96–108)
CO2 SERPL-SCNC: 27 MMOL/L — SIGNIFICANT CHANGE UP (ref 22–31)
CREAT SERPL-MCNC: 0.84 MG/DL — SIGNIFICANT CHANGE UP (ref 0.5–1.3)
EGFR: 79 ML/MIN/1.73M2 — SIGNIFICANT CHANGE UP
GLUCOSE SERPL-MCNC: 139 MG/DL — HIGH (ref 70–99)
HCT VFR BLD CALC: 39.8 % — SIGNIFICANT CHANGE UP (ref 34.5–45)
HCV AB S/CO SERPL IA: 0.15 S/CO — SIGNIFICANT CHANGE UP (ref 0–0.99)
HCV AB SERPL-IMP: SIGNIFICANT CHANGE UP
HGB BLD-MCNC: 13.3 G/DL — SIGNIFICANT CHANGE UP (ref 11.5–15.5)
MCHC RBC-ENTMCNC: 27.1 PG — SIGNIFICANT CHANGE UP (ref 27–34)
MCHC RBC-ENTMCNC: 33.4 GM/DL — SIGNIFICANT CHANGE UP (ref 32–36)
MCV RBC AUTO: 81.2 FL — SIGNIFICANT CHANGE UP (ref 80–100)
PLATELET # BLD AUTO: 355 K/UL — SIGNIFICANT CHANGE UP (ref 150–400)
POTASSIUM SERPL-MCNC: 3.6 MMOL/L — SIGNIFICANT CHANGE UP (ref 3.5–5.3)
POTASSIUM SERPL-SCNC: 3.6 MMOL/L — SIGNIFICANT CHANGE UP (ref 3.5–5.3)
RBC # BLD: 4.9 M/UL — SIGNIFICANT CHANGE UP (ref 3.8–5.2)
RBC # FLD: 13.7 % — SIGNIFICANT CHANGE UP (ref 10.3–14.5)
SODIUM SERPL-SCNC: 132 MMOL/L — LOW (ref 135–145)
WBC # BLD: 13.87 K/UL — HIGH (ref 3.8–10.5)
WBC # FLD AUTO: 13.87 K/UL — HIGH (ref 3.8–10.5)

## 2023-09-12 PROCEDURE — 99222 1ST HOSP IP/OBS MODERATE 55: CPT

## 2023-09-12 PROCEDURE — 99232 SBSQ HOSP IP/OBS MODERATE 35: CPT

## 2023-09-12 RX ORDER — SODIUM CHLORIDE 9 MG/ML
1000 INJECTION INTRAMUSCULAR; INTRAVENOUS; SUBCUTANEOUS
Refills: 0 | Status: DISCONTINUED | OUTPATIENT
Start: 2023-09-12 | End: 2023-09-13

## 2023-09-12 RX ORDER — METOCLOPRAMIDE HCL 10 MG
10 TABLET ORAL EVERY 8 HOURS
Refills: 0 | Status: DISCONTINUED | OUTPATIENT
Start: 2023-09-12 | End: 2023-09-12

## 2023-09-12 RX ORDER — PROCHLORPERAZINE MALEATE 5 MG
5 TABLET ORAL EVERY 8 HOURS
Refills: 0 | Status: DISCONTINUED | OUTPATIENT
Start: 2023-09-13 | End: 2023-09-13

## 2023-09-12 RX ADMIN — Medication 5 MILLIGRAM(S): at 08:08

## 2023-09-12 RX ADMIN — SODIUM CHLORIDE 100 MILLILITER(S): 9 INJECTION INTRAMUSCULAR; INTRAVENOUS; SUBCUTANEOUS at 10:44

## 2023-09-12 NOTE — DIETITIAN INITIAL EVALUATION ADULT - ORAL INTAKE PTA/DIET HISTORY
Lives at home, able to cook and grocery shop w/o difficulties. Reports of decreased appetite x 2 days PTA 2/2 N/V/D. Last meal consumed was chicken marsala when symptoms began. Typically has good appetite. Prescribed Ozempic from endocrinologist for DM management which has changed her eating habits (consumes meals w/ smaller portions 2/2 early satiety). Last A1c 6.2% indicates good glycemic control; regularly sees endocrinologist.

## 2023-09-12 NOTE — DIETITIAN INITIAL EVALUATION ADULT - NS FNS DIET ORDER
Diet, Full Liquid:   Consistent Carbohydrate {Evening Snack} (CSTCHOSN) (09-12-23 @ 13:42)  Diet, Clear Liquid:   No Red Dye (09-11-23 @ 22:01)

## 2023-09-12 NOTE — CONSULT NOTE ADULT - ASSESSMENT
63 year old female with 48 hour history nausea, vomiting, diarrhea    Imp: Likely viral gastroenteritis, CT neg    Symptoms with resolution.  No stools sent as patient has not had BM.    Rec:  ::Adv diet as danielle  ::IVF and electrolyte repletion per primary team  ::Antiemetics prn

## 2023-09-12 NOTE — DIETITIAN INITIAL EVALUATION ADULT - ADD RECOMMEND
1) ADAT as per GI  2) Encourage protein-rich foods, maximize food preferences when medically feasible  3) Monitor bowel movements, if no BM for >3 days, consider implementing bowel regimen.  4) MVI w/ minerals daily to ensure 100% RDA met   5) Monitor daily lytes/min and replete prn   6) Obtain weekly wt to track/trend changes. Monitor I&Os  RD will continue to monitor PO intake/ tolerance, labs, hydration, and wt prn.

## 2023-09-12 NOTE — CONSULT NOTE ADULT - NS_MD_PANP_GEN_ALL_CORE
Attending and PA/NP shared services statement (NON-critical care): Sarecycline Pregnancy And Lactation Text: This medication is Pregnancy Category D and not consider safe during pregnancy. It is also excreted in breast milk. Birth Control Pills Pregnancy And Lactation Text: This medication should be avoided if pregnant and for the first 30 days post-partum. Isotretinoin Counseling: Patient should get monthly blood tests, not donate blood, not drive at night if vision affected, not share medication, and not undergo elective surgery for 6 months after tx completed. Side effects reviewed, pt to contact office should one occur. Benzoyl Peroxide Counseling: Patient counseled that medicine may cause skin irritation and bleach clothing.  In the event of skin irritation, the patient was advised to reduce the amount of the drug applied or use it less frequently.   The patient verbalized understanding of the proper use and possible adverse effects of benzoyl peroxide.  All of the patient's questions and concerns were addressed. Topical Clindamycin Pregnancy And Lactation Text: This medication is Pregnancy Category B and is considered safe during pregnancy. It is unknown if it is excreted in breast milk. Azithromycin Counseling:  I discussed with the patient the risks of azithromycin including but not limited to GI upset, allergic reaction, drug rash, diarrhea, and yeast infections. Topical Retinoid counseling:  Patient advised to apply a pea-sized amount only at bedtime and wait 30 minutes after washing their face before applying.  If too drying, patient may add a non-comedogenic moisturizer. The patient verbalized understanding of the proper use and possible adverse effects of retinoids.  All of the patient's questions and concerns were addressed. High Dose Vitamin A Counseling: Side effects reviewed, pt to contact office should one occur. Topical Sulfur Applications Pregnancy And Lactation Text: This medication is Pregnancy Category C and has an unknown safety profile during pregnancy. It is unknown if this topical medication is excreted in breast milk. Dapsone Pregnancy And Lactation Text: This medication is Pregnancy Category C and is not considered safe during pregnancy or breast feeding. Spironolactone Pregnancy And Lactation Text: This medication can cause feminization of the male fetus and should be avoided during pregnancy. The active metabolite is also found in breast milk. Azithromycin Pregnancy And Lactation Text: This medication is considered safe during pregnancy and is also secreted in breast milk. Topical Retinoid Pregnancy And Lactation Text: This medication is Pregnancy Category C. It is unknown if this medication is excreted in breast milk. Aklief counseling:  Patient advised to apply a pea-sized amount only at bedtime and wait 30 minutes after washing their face before applying.  If too drying, patient may add a non-comedogenic moisturizer.  The most commonly reported side effects including irritation, redness, scaling, dryness, stinging, burning, itching, and increased risk of sunburn.  The patient verbalized understanding of the proper use and possible adverse effects of retinoids.  All of the patient's questions and concerns were addressed. Azelaic Acid Counseling: Patient counseled that medicine may cause skin irritation and to avoid applying near the eyes.  In the event of skin irritation, the patient was advised to reduce the amount of the drug applied or use it less frequently.   The patient verbalized understanding of the proper use and possible adverse effects of azelaic acid.  All of the patient's questions and concerns were addressed. Include Pregnancy/Lactation Warning?: No Erythromycin Counseling:  I discussed with the patient the risks of erythromycin including but not limited to GI upset, allergic reaction, drug rash, diarrhea, increase in liver enzymes, and yeast infections. Tazorac Pregnancy And Lactation Text: This medication is not safe during pregnancy. It is unknown if this medication is excreted in breast milk. Bactrim Pregnancy And Lactation Text: This medication is Pregnancy Category D and is known to cause fetal risk.  It is also excreted in breast milk. Spironolactone Counseling: Patient advised regarding risks of diarrhea, abdominal pain, hyperkalemia, birth defects (for female patients), liver toxicity and renal toxicity. The patient may need blood work to monitor liver and kidney function and potassium levels while on therapy. The patient verbalized understanding of the proper use and possible adverse effects of spironolactone.  All of the patient's questions and concerns were addressed. Azelaic Acid Pregnancy And Lactation Text: This medication is considered safe during pregnancy and breast feeding. Topical Sulfur Applications Counseling: Topical Sulfur Counseling: Patient counseled that this medication may cause skin irritation or allergic reactions.  In the event of skin irritation, the patient was advised to reduce the amount of the drug applied or use it less frequently.   The patient verbalized understanding of the proper use and possible adverse effects of topical sulfur application.  All of the patient's questions and concerns were addressed. Tetracycline Counseling: Patient counseled regarding possible photosensitivity and increased risk for sunburn.  Patient instructed to avoid sunlight, if possible.  When exposed to sunlight, patients should wear protective clothing, sunglasses, and sunscreen.  The patient was instructed to call the office immediately if the following severe adverse effects occur:  hearing changes, easy bruising/bleeding, severe headache, or vision changes.  The patient verbalized understanding of the proper use and possible adverse effects of tetracycline.  All of the patient's questions and concerns were addressed. Patient understands to avoid pregnancy while on therapy due to potential birth defects. Winlevi Counseling:  I discussed with the patient the risks of topical clascoterone including but not limited to erythema, scaling, itching, and stinging. Patient voiced their understanding. Doxycycline Counseling:  Patient counseled regarding possible photosensitivity and increased risk for sunburn.  Patient instructed to avoid sunlight, if possible.  When exposed to sunlight, patients should wear protective clothing, sunglasses, and sunscreen.  The patient was instructed to call the office immediately if the following severe adverse effects occur:  hearing changes, easy bruising/bleeding, severe headache, or vision changes.  The patient verbalized understanding of the proper use and possible adverse effects of doxycycline.  All of the patient's questions and concerns were addressed. High Dose Vitamin A Pregnancy And Lactation Text: High dose vitamin A therapy is contraindicated during pregnancy and breast feeding. Isotretinoin Pregnancy And Lactation Text: This medication is Pregnancy Category X and is considered extremely dangerous during pregnancy. It is unknown if it is excreted in breast milk. Benzoyl Peroxide Pregnancy And Lactation Text: This medication is Pregnancy Category C. It is unknown if benzoyl peroxide is excreted in breast milk. Dapsone Counseling: I discussed with the patient the risks of dapsone including but not limited to hemolytic anemia, agranulocytosis, rashes, methemoglobinemia, kidney failure, peripheral neuropathy, headaches, GI upset, and liver toxicity.  Patients who start dapsone require monitoring including baseline LFTs and weekly CBCs for the first month, then every month thereafter.  The patient verbalized understanding of the proper use and possible adverse effects of dapsone.  All of the patient's questions and concerns were addressed. Minocycline Counseling: Patient advised regarding possible photosensitivity and discoloration of the teeth, skin, lips, tongue and gums.  Patient instructed to avoid sunlight, if possible.  When exposed to sunlight, patients should wear protective clothing, sunglasses, and sunscreen.  The patient was instructed to call the office immediately if the following severe adverse effects occur:  hearing changes, easy bruising/bleeding, severe headache, or vision changes.  The patient verbalized understanding of the proper use and possible adverse effects of minocycline.  All of the patient's questions and concerns were addressed. Doxycycline Pregnancy And Lactation Text: This medication is Pregnancy Category D and not consider safe during pregnancy. It is also excreted in breast milk but is considered safe for shorter treatment courses. Bactrim Counseling:  I discussed with the patient the risks of sulfa antibiotics including but not limited to GI upset, allergic reaction, drug rash, diarrhea, dizziness, photosensitivity, and yeast infections.  Rarely, more serious reactions can occur including but not limited to aplastic anemia, agranulocytosis, methemoglobinemia, blood dyscrasias, liver or kidney failure, lung infiltrates or desquamative/blistering drug rashes. Winlevi Pregnancy And Lactation Text: This medication is considered safe during pregnancy and breastfeeding. Tazorac Counseling:  Patient advised that medication is irritating and drying.  Patient may need to apply sparingly and wash off after an hour before eventually leaving it on overnight.  The patient verbalized understanding of the proper use and possible adverse effects of tazorac.  All of the patient's questions and concerns were addressed. Sarecycline Counseling: Patient advised regarding possible photosensitivity and discoloration of the teeth, skin, lips, tongue and gums.  Patient instructed to avoid sunlight, if possible.  When exposed to sunlight, patients should wear protective clothing, sunglasses, and sunscreen.  The patient was instructed to call the office immediately if the following severe adverse effects occur:  hearing changes, easy bruising/bleeding, severe headache, or vision changes.  The patient verbalized understanding of the proper use and possible adverse effects of sarecycline.  All of the patient's questions and concerns were addressed. Erythromycin Pregnancy And Lactation Text: This medication is Pregnancy Category B and is considered safe during pregnancy. It is also excreted in breast milk. Topical Clindamycin Counseling: Patient counseled that this medication may cause skin irritation or allergic reactions.  In the event of skin irritation, the patient was advised to reduce the amount of the drug applied or use it less frequently.   The patient verbalized understanding of the proper use and possible adverse effects of clindamycin.  All of the patient's questions and concerns were addressed. Birth Control Pills Counseling: Birth Control Pill Counseling: I discussed with the patient the potential side effects of OCPs including but not limited to increased risk of stroke, heart attack, thrombophlebitis, deep venous thrombosis, hepatic adenomas, breast changes, GI upset, headaches, and depression.  The patient verbalized understanding of the proper use and possible adverse effects of OCPs. All of the patient's questions and concerns were addressed. Detail Level: Detailed Aklief Pregnancy And Lactation Text: It is unknown if this medication is safe to use during pregnancy.  It is unknown if this medication is excreted in breast milk.  Breastfeeding women should use the topical cream on the smallest area of the skin for the shortest time needed while breastfeeding.  Do not apply to nipple and areola. Detail Level: Simple

## 2023-09-12 NOTE — DIETITIAN INITIAL EVALUATION ADULT - PERTINENT LABORATORY DATA
09-12    132<L>  |  100  |  13  ----------------------------<  139<H>  3.6   |  27  |  0.84    Ca    9.4      12 Sep 2023 08:47    TPro  9.1<H>  /  Alb  4.2  /  TBili  0.6  /  DBili  x   /  AST  16  /  ALT  30  /  AlkPhos  90  09-11

## 2023-09-12 NOTE — DIETITIAN INITIAL EVALUATION ADULT - NUTRITION DIAGNOSIS
How Severe Is It?: mild
Is This A New Presentation, Or A Follow-Up?: Follow Up Seborrheic Dermatitis
yes...

## 2023-09-12 NOTE — DIETITIAN INITIAL EVALUATION ADULT - OTHER INFO
64 y/o female with a PMHx of DM, kidney stones, ureteral stent in 2018 who presents to [place]  ED c/o lower abd pain and n/v/d since 4am yesterday morning. Pt reported subjective fevers, chills, fatigue and had 15-16 episodes of vomiting yesterday, with 3-4 loose stools. She denies dark or bloody stools. Pt reports vomiting with bilious looking emesis. Pt reports she went to a party in [place] the night before ( two days ago) onset of symptoms. She ate chicken marsala and denies knowing anyone else who was ill, no known sick contacts. Pt reports 6-7 /10 lower abd pain aching, dull and non- radiating.  Pt also reports 7/10 epigastric pain. Pt has severe nausea today and has been unable to eat or drink. Pt reports burning urination yesterday but  denies urinary complaints now. Pt reports usually having constipation, with a bowel movement every 3-4 days. She has been on Ozempic for 4 months , with weight loss of 20 lbs. She reports her Hg A1C was 6.2 last week.  FULL CODE.    Tolerating CLD upon RD visit. Reports of no N/V/D since admission. UBW stated at 185# x 6 months ago however endorses wt loss, stating current wt at 155#; 30# wt loss/ 16.21% - severe and clinically significant. NFPE reveals no muscle/fat wasting at this time. ADAT as per GI to FLD when medically feasible. See recommendations below.

## 2023-09-12 NOTE — CONSULT NOTE ADULT - SUBJECTIVE AND OBJECTIVE BOX
Patient is a 63y old  Female who presents with a chief complaint of Nausea and vomiting    HPI:  This is a 63 year old female with significant past medical history of DM, kidney stones, ureteral stent in 2018 presenting to [facility] with report of lower abdominal pain for >24hours. Per patient had numer >12 episodes nonbloody vomiting with 3-4 loose nonbloody stools. Currently lying on left side, awoken from sleep briefly. Still endorsing intermittent nausea and dull ache in abdomen.  Denies any new medications, recent travel, or known ill contacts. Had chicken [employer] at party in [place] 48 hours prior. reports episode chills. Denies fever, shortness of breath, chest pain. Denies any similar episodes in the past. Also with burning with urination. Denies any overt signs of GIB including hematochezia, melena, or hematemesis. CT negative for any obstruction or inflammation bowel. Afebrile. Leukocytosis.      PAST MEDICAL & SURGICAL HISTORY:  Type 2 diabetes mellitus without complication, without long-term current use of insulin    Kidney stones      S/P  section      S/P knee replacement  right partial knee replacement       H/O tubal ligation        MEDICATIONS  (STANDING):  influenza   Vaccine 0.5 milliLiter(s) IntraMuscular once  sodium chloride 0.9%. 1000 milliLiter(s) (100 mL/Hr) IV Continuous <Continuous>    MEDICATIONS  (PRN):  ondansetron Injectable 4 milliGRAM(s) IV Push every 4 hours PRN Nausea and/or Vomiting  ondansetron Injectable 4 milliGRAM(s) IV Push once PRN Nausea and/or Vomiting  prochlorperazine   Injectable 5 milliGRAM(s) IV Push every 8 hours PRN nausea      Allergies    penicillins (Hives)    Intolerances    SOCIAL HISTORY:    FAMILY HISTORY:  No pertinent family history in first degree relatives    REVIEW OF SYSTEMS:    CONSTITUTIONAL: No weakness, fevers or chills  EYES/ENT: No visual changes;  No vertigo or throat pain   NECK: No pain or stiffness  RESPIRATORY: No cough, wheezing, hemoptysis; No shortness of breath  CARDIOVASCULAR: No chest pain or palpitations  GASTROINTESTINAL: See HPI  GENITOURINARY: No dysuria, frequency or hematuria  NEUROLOGICAL: No numbness or weakness  SKIN: No itching, burning, rashes, or lesions   PSYCH: Normal mood and affect  All other review of systems is negative unless indicated above.    Vital Signs Last 24 Hrs  T(C): 37.2 (12 Sep 2023 07:47), Max: 37.2 (12 Sep 2023 07:47)  T(F): 99 (12 Sep 2023 07:47), Max: 99 (12 Sep 2023 07:47)  HR: 81 (12 Sep 2023 07:47) (80 - 83)  BP: 144/79 (12 Sep 2023 07:47) (142/71 - 152/88)  BP(mean): 107 (11 Sep 2023 18:37) (107 - 107)  RR: 18 (12 Sep 2023 07:47) (17 - 18)  SpO2: 100% (12 Sep 2023 07:47) (98% - 100%)    Parameters below as of 12 Sep 2023 07:47  Patient On (Oxygen Delivery Method): room air        PHYSICAL EXAM:    Constitutional: No acute distress, well-developed, non-toxic appearing  HEENT: masked, good phonation, not icteric  Neck: supple, no lymphadenopathy  Respiratory: clear to ascultation bilaterally, no wheezing  Cardiovascular: S1 and S2, regular rate and rhythm, no murmurs rubs or gallops  Gastrointestinal: soft, non-tender, non-distended, +bowel sounds, no rebound or guarding, no surgical scars, no drains  Extremities: No peripheral edema, no cyanosis or clubbing  Vascular: 2+ peripheral pulses, no venous stasis  Neurological: A/O x 3, no focal deficits, no asterixis  Psychiatric: Normal mood, normal affect  Skin: No rashes, not jaundiced    LABS:                        13.3   13.87 )-----------( 355      ( 12 Sep 2023 08:47 )             39.8     09-12    132<L>  |  100  |  13  ----------------------------<  139<H>  3.6   |  27  |  0.84    Ca    9.4      12 Sep 2023 08:47    TPro  9.1<H>  /  Alb  4.2  /  TBili  0.6  /  DBili  x   /  AST  16  /  ALT  30  /  AlkPhos  90  09-11      LIVER FUNCTIONS - ( 11 Sep 2023 10:05 )  Alb: 4.2 g/dL / Pro: 9.1 gm/dL / ALK PHOS: 90 U/L / ALT: 30 U/L / AST: 16 U/L / GGT: x             RADIOLOGY & ADDITIONAL STUDIES:'  FINDINGS:  LOWER CHEST: Small hiatal hernia.    LIVER: Within normal limits.  BILE DUCTS: Normal caliber.  GALLBLADDER: Within normal limits.  SPLEEN: Within normal limits.  PANCREAS: Within normal limits.  ADRENALS: Within normal limits.  KIDNEYS/URETERS: Within normal limits.    BLADDER: Within normal limits.  REPRODUCTIVE ORGANS: Unremarkable    BOWEL: No bowel obstruction. Appendix normal  PERITONEUM: No ascites.  VESSELS: Within normal limits.  RETROPERITONEUM/LYMPH NODES: No lymphadenopathy.  ABDOMINAL WALL: Small fat-containing umbilical hernia.  BONES: Spinal degenerative change.    IMPRESSION:  No acute inflammatory or obstructive pathology. No diagnostic findings to   account for the symptomatology

## 2023-09-12 NOTE — PROGRESS NOTE ADULT - ASSESSMENT
Pt is admitted w/      Abd pain  Ddx: Gastroenteritis, gastritis , medication side effect to potassium citrate  persistent vomiting  diarrhea  epigastric pain from vomiting?    Hx of Potassium Citrate use for renal stones.   Possible medication induced side effect noted  Hx of DM on Ozempic, last HgA1C 6.2    - improving overall, unclear etiology   - s/p IVF 2 L NS in the ED  - s/p Rocephin 1gm in ED for possible UTI,  hold further ceftriaxone as ua is neg for nitrites and leuk est, pt denies urinary complaints today  - f/u ucx  - s/p zofran in the ED, ---reglan --- now on prn compazine   - will discont potassium citrate   - dc'd stool culture   - GI consult Dr. Elizondo  - DVT proph: ambulation  - Full Code    discussed plan with pt, rn,  and dr. fonseca

## 2023-09-13 ENCOUNTER — TRANSCRIPTION ENCOUNTER (OUTPATIENT)
Age: 63
End: 2023-09-13

## 2023-09-13 VITALS
HEART RATE: 76 BPM | SYSTOLIC BLOOD PRESSURE: 144 MMHG | TEMPERATURE: 98 F | RESPIRATION RATE: 18 BRPM | DIASTOLIC BLOOD PRESSURE: 81 MMHG | OXYGEN SATURATION: 100 %

## 2023-09-13 LAB
ALBUMIN SERPL ELPH-MCNC: 3.6 G/DL — SIGNIFICANT CHANGE UP (ref 3.3–5)
ALP SERPL-CCNC: 79 U/L — SIGNIFICANT CHANGE UP (ref 40–120)
ALT FLD-CCNC: 26 U/L — SIGNIFICANT CHANGE UP (ref 12–78)
ANION GAP SERPL CALC-SCNC: 5 MMOL/L — SIGNIFICANT CHANGE UP (ref 5–17)
AST SERPL-CCNC: 17 U/L — SIGNIFICANT CHANGE UP (ref 15–37)
BILIRUB SERPL-MCNC: 0.7 MG/DL — SIGNIFICANT CHANGE UP (ref 0.2–1.2)
BUN SERPL-MCNC: 9 MG/DL — SIGNIFICANT CHANGE UP (ref 7–23)
CALCIUM SERPL-MCNC: 9.1 MG/DL — SIGNIFICANT CHANGE UP (ref 8.5–10.1)
CHLORIDE SERPL-SCNC: 102 MMOL/L — SIGNIFICANT CHANGE UP (ref 96–108)
CO2 SERPL-SCNC: 29 MMOL/L — SIGNIFICANT CHANGE UP (ref 22–31)
CREAT SERPL-MCNC: 0.67 MG/DL — SIGNIFICANT CHANGE UP (ref 0.5–1.3)
CULTURE RESULTS: SIGNIFICANT CHANGE UP
EGFR: 99 ML/MIN/1.73M2 — SIGNIFICANT CHANGE UP
GLUCOSE SERPL-MCNC: 127 MG/DL — HIGH (ref 70–99)
HCT VFR BLD CALC: 38.9 % — SIGNIFICANT CHANGE UP (ref 34.5–45)
HGB BLD-MCNC: 13.3 G/DL — SIGNIFICANT CHANGE UP (ref 11.5–15.5)
MCHC RBC-ENTMCNC: 27.5 PG — SIGNIFICANT CHANGE UP (ref 27–34)
MCHC RBC-ENTMCNC: 34.2 GM/DL — SIGNIFICANT CHANGE UP (ref 32–36)
MCV RBC AUTO: 80.4 FL — SIGNIFICANT CHANGE UP (ref 80–100)
PLATELET # BLD AUTO: 335 K/UL — SIGNIFICANT CHANGE UP (ref 150–400)
POTASSIUM SERPL-MCNC: 3.7 MMOL/L — SIGNIFICANT CHANGE UP (ref 3.5–5.3)
POTASSIUM SERPL-SCNC: 3.7 MMOL/L — SIGNIFICANT CHANGE UP (ref 3.5–5.3)
PROT SERPL-MCNC: 8 GM/DL — SIGNIFICANT CHANGE UP (ref 6–8.3)
RBC # BLD: 4.84 M/UL — SIGNIFICANT CHANGE UP (ref 3.8–5.2)
RBC # FLD: 13.2 % — SIGNIFICANT CHANGE UP (ref 10.3–14.5)
SODIUM SERPL-SCNC: 136 MMOL/L — SIGNIFICANT CHANGE UP (ref 135–145)
SPECIMEN SOURCE: SIGNIFICANT CHANGE UP
WBC # BLD: 11.91 K/UL — HIGH (ref 3.8–10.5)
WBC # FLD AUTO: 11.91 K/UL — HIGH (ref 3.8–10.5)

## 2023-09-13 PROCEDURE — 99239 HOSP IP/OBS DSCHRG MGMT >30: CPT

## 2023-09-13 PROCEDURE — 99231 SBSQ HOSP IP/OBS SF/LOW 25: CPT

## 2023-09-13 RX ORDER — SEMAGLUTIDE 0.68 MG/ML
1 INJECTION, SOLUTION SUBCUTANEOUS
Qty: 0 | Refills: 0 | DISCHARGE

## 2023-09-13 RX ORDER — POTASSIUM CITRATE MONOHYDRATE 100 %
2 POWDER (GRAM) MISCELLANEOUS
Refills: 0 | DISCHARGE

## 2023-09-13 RX ADMIN — Medication 30 MILLILITER(S): at 02:56

## 2023-09-13 RX ADMIN — SODIUM CHLORIDE 100 MILLILITER(S): 9 INJECTION INTRAMUSCULAR; INTRAVENOUS; SUBCUTANEOUS at 05:46

## 2023-09-13 NOTE — DISCHARGE NOTE NURSING/CASE MANAGEMENT/SOCIAL WORK - PATIENT PORTAL LINK FT
You can access the FollowMyHealth Patient Portal offered by [facility] by registering at the following website: [email or web address]. By joining [employer]’s FollowMyHealth portal, you will also be able to view your health information using other applications (apps) compatible with our system.

## 2023-09-13 NOTE — DISCHARGE NOTE PROVIDER - NSDCCPCAREPLAN_GEN_ALL_CORE_FT
PRINCIPAL DISCHARGE DIAGNOSIS  Diagnosis: Intractable nausea and vomiting  Assessment and Plan of Treatment: Continue taking medications as prescribed   Follow up with primary care provider within 1 week   Take all discharge paperwork with you to appointment.   Return to ED if you develop any worsening symptoms, chest pain, shortness of breath, Headache, fever unrelieved with tylenol or ibuprofen, unable to eat or drink, or unable to urinate.         SECONDARY DISCHARGE DIAGNOSES  Diagnosis: Nausea vomiting and diarrhea  Assessment and Plan of Treatment:

## 2023-09-13 NOTE — DISCHARGE NOTE PROVIDER - NSDCMRMEDTOKEN_GEN_ALL_CORE_FT
Ozempic 4 mg/3 mL (1 mg dose) subcutaneous solution: 1 milligram(s) subcutaneously once a week on Friday  PLEASE FOLLOW UP WITH YOUR DOCTOR IF YOU STILL REQUIRE OZEMPIC  promethazine 25 mg oral tablet: 1 tab(s) orally 3 times a day as needed for  nausea

## 2023-09-13 NOTE — DISCHARGE NOTE PROVIDER - HOSPITAL COURSE
HPI:  Pt is a pleasant  64 y/o female with a PMHx of DM, kidney stones, ureteral stent in 2018 who  presents to [place]  ED c/o lower abd pain and n/v/d since 4am yesterday morning.   Pt reported subjective  fevers, chills, fatigue and had 15-16 episodes of vomiting yesterday, with 3-4 loose stools .  She denies dark or bloody stools.  Pt reports vomiting with bilious looking emesis.      Pt reports she went to a party in [place] the night before ( two days ago) onset of symptoms.    She ate chicken marsala and denies knowing anyone else  who was ill, no known sick contacts.   Pt reports 6-7 /10 lower abd pain aching, dull and  non- radiating.  Pt also reports 7/10 epigastric pain.    Pt has severe nausea today and has been unable to eat or drink.   Pt reports burning urination yesterday but  denies urinary complaints now.   Pt reports usually having constipation, with a bowel movement every 3-4 days.   She has been on Ozempic for 4 months , with weight loss of 20 lbs.  She reports her Hg A1C was 6.2 last week.      Pt denies respiratory symptoms, no  cough, no SOB, no HA, no flank pain,  no edema.    Pt reports her last colonoscopy was 19 years ago.      pt admitted for dehydration/ nausea vomiting diarrhea. was supplemented with IV fluids and given IV Compezine with improvement. diet was slowly advanced and is now tolerating regular diet. no n/v/d. pt was evaled by GI NP Papaleo - antiemetics prn, supportive care. pt a1c 6.2. dc'd potassium and holding ozemic. reccomended for pt to follow up with prescribing provider to determine if ozemic is still required. pt was provided for supportive care.     Abd pain  Ddx: Gastroenteritis, gastritis , medication side effect to potassium citrate vs ozemic?   persistent vomiting  diarrhea  epigastric pain from vomiting    Hx of Potassium Citrate use for renal stones.   Possible medication induced side effect noted  Hx of DM on Ozempic, last HgA1C 6.2    - resolved , unclear etiology   - s/p IVF 2 L NS in the ED  - s/p Rocephin 1gm in ED for possible UTI, no further ceftriaxone as ua is neg for nitrites and leuk est, pt denies urinary complaints today  - s/p zofran in the ED, ---reglan --- now on prn compazine rx provided   - will discont potassium citrate , recc to follow up wiht provider who rx ozempic to determine if ozempic is still required   - dc'd stool culture   - GI consult  - DVT proph: ambulation  - Full Code    discussed plan with pt, rn,  and dr. yoder  spent __51_ mins preparing dc.      HPI:  Pt is a pleasant  62 y/o female with a PMHx of DM, kidney stones, ureteral stent in 2018 who  presents to [place]  ED c/o lower abd pain and n/v/d since 4am yesterday morning.   Pt reported subjective  fevers, chills, fatigue and had 15-16 episodes of vomiting yesterday, with 3-4 loose stools .  She denies dark or bloody stools.  Pt reports vomiting with bilious looking emesis.      Pt reports she went to a party in [place] the night before ( two days ago) onset of symptoms.    She ate chicken marsala and denies knowing anyone else  who was ill, no known sick contacts.   Pt reports 6-7 /10 lower abd pain aching, dull and  non- radiating.  Pt also reports 7/10 epigastric pain.    Pt has severe nausea today and has been unable to eat or drink.   Pt reports burning urination yesterday but  denies urinary complaints now.   Pt reports usually having constipation, with a bowel movement every 3-4 days.   She has been on Ozempic for 4 months , with weight loss of 20 lbs.  She reports her Hg A1C was 6.2 last week.      Pt denies respiratory symptoms, no  cough, no SOB, no HA, no flank pain,  no edema.    Pt reports her last colonoscopy was 19 years ago.      pt admitted for dehydration/ nausea vomiting diarrhea. was supplemented with IV fluids and given IV Compezine with improvement. diet was slowly advanced and is now tolerating regular diet. no n/v/d. pt was evaled by GI NP Papaleo - antiemetics prn, supportive care. pt a1c 6.2. dc'd potassium and holding ozemic. reccomended for pt to follow up with prescribing provider to determine if ozemic is still required. pt was provided for supportive care.     Abd pain  Ddx: Gastroenteritis, gastritis , medication side effect to potassium citrate vs ozemic?   persistent vomiting  diarrhea  epigastric pain from vomiting    Hx of Potassium Citrate use for renal stones.   Possible medication induced side effect noted  Hx of DM on Ozempic, last HgA1C 6.2    - resolved , unclear etiology   - s/p IVF 2 L NS in the ED  - s/p Rocephin 1gm in ED for possible UTI, no further ceftriaxone as ua is neg for nitrites and leuk est, pt denies urinary complaints today  - s/p zofran in the ED, ---reglan --- now on prn compazine rx provided   - will discont potassium citrate , recc to follow up wiht provider who rx ozempic to determine if ozempic is still required   - dc'd stool culture   - DVT proph: ambulation  - Full Code    discussed plan with pt, rn,  and dr. yoder  spent __51_ mins preparing dc.     Attending Attestation:  I agree with the assessment and plan of YISEL Perea as stated and discussed.

## 2023-09-13 NOTE — PROGRESS NOTE ADULT - ASSESSMENT
63 year old female with 48 hour history nausea, vomiting, diarrhea    Imp: viral gastroenteritis, CT neg    Clinically improved. Tolerating regular diet and fluids without n/v/d.    Rec:  ::Ok to dc to home

## 2023-09-13 NOTE — DISCHARGE NOTE PROVIDER - NSDCPNSUBOBJ_GEN_ALL_CORE
All 10 systems reviewed and found to be negative with the exception of what has been described above.    Vital Signs Last 24 Hrs  T(C): 36.7 (13 Sep 2023 08:40), Max: 37.4 (12 Sep 2023 23:18)  T(F): 98.1 (13 Sep 2023 08:40), Max: 99.4 (12 Sep 2023 23:18)  HR: 76 (13 Sep 2023 08:40) (76 - 86)  BP: 144/81 (13 Sep 2023 08:40) (131/73 - 146/72)  BP(mean): --  RR: 18 (13 Sep 2023 08:40) (18 - 18)  SpO2: 100% (13 Sep 2023 08:40) (97% - 100%) room air       PHYSICAL EXAM:    Constitutional: NAD, awake and alert, well-developed  HEENT: PERR, EOMI, Normal Hearing, MMM  Neck: Soft and supple, No LAD, No JVD  Respiratory: Breath sounds are clear bilaterally, No wheezing, rales or rhonchi  Cardiovascular: S1 and S2, regular rate and rhythm, no Murmurs, gallops or rubs  Gastrointestinal: Bowel Sounds present, soft, nontender, nondistended, no guarding, no rebound  Extremities: No peripheral edema  Vascular: 2+ peripheral pulses  Neurological: A/O x 3, no focal deficits  Musculoskeletal: 5/5 strength b/l upper and lower extremities  Skin: No rashes                            13.3   11.91 )-----------( 335      ( 13 Sep 2023 04:56 )             38.9     09-13    136  |  102  |  9   ----------------------------<  127<H>  3.7   |  29  |  0.67    Ca    9.1      13 Sep 2023 04:56    TPro  8.0  /  Alb  3.6  /  TBili  0.7  /  DBili  x   /  AST  17  /  ALT  26  /  AlkPhos  79  09-13        LIVER FUNCTIONS - ( 13 Sep 2023 04:56 )  Alb: 3.6 g/dL / Pro: 8.0 gm/dL / ALK PHOS: 79 U/L / ALT: 26 U/L / AST: 17 U/L / GGT: x             Urinalysis Basic - ( 13 Sep 2023 04:56 )    Color: x / Appearance: x / SG: x / pH: x  Gluc: 127 mg/dL / Ketone: x  / Bili: x / Urobili: x   Blood: x / Protein: x / Nitrite: x   Leuk Esterase: x / RBC: x / WBC x   Sq Epi: x / Non Sq Epi: x / Bacteria: x    < from: CT Abdomen and Pelvis w/ IV Cont (09.11.23 @ 10:50) >    IMPRESSION:  No acute inflammatory or obstructive pathology. No diagnostic findings to   account for the symptomatology        --- End of Report ---            KADE BENAVIDES MD; Attending Radiologist  This document has been electronically signed. Sep 11 2023 10:58AM    < end of copied text >

## 2023-09-13 NOTE — PROGRESS NOTE ADULT - SUBJECTIVE AND OBJECTIVE BOX
Patient is a 63y old  Female who presents with a chief complaint of Abd pain    Followup: viral gastroenteritis  Much improved today- patient tolerated breakfast, no nausea or vomiting and asking to go home.    MEDICATIONS  (STANDING):  influenza   Vaccine 0.5 milliLiter(s) IntraMuscular once    MEDICATIONS  (PRN):  ondansetron Injectable 4 milliGRAM(s) IV Push every 4 hours PRN Nausea and/or Vomiting  ondansetron Injectable 4 milliGRAM(s) IV Push once PRN Nausea and/or Vomiting  prochlorperazine   Injectable 5 milliGRAM(s) IntraMuscular every 8 hours PRN nausea  prochlorperazine   Injectable 5 milliGRAM(s) IV Push every 8 hours PRN nausea      Vital Signs Last 24 Hrs  T(C): 36.7 (13 Sep 2023 08:40), Max: 37.4 (12 Sep 2023 23:18)  T(F): 98.1 (13 Sep 2023 08:40), Max: 99.4 (12 Sep 2023 23:18)  HR: 76 (13 Sep 2023 08:40) (76 - 86)  BP: 144/81 (13 Sep 2023 08:40) (131/73 - 146/72)  BP(mean): --  RR: 18 (13 Sep 2023 08:40) (18 - 18)  SpO2: 100% (13 Sep 2023 08:40) (97% - 100%)    Parameters below as of 13 Sep 2023 08:40  Patient On (Oxygen Delivery Method): room air        PHYSICAL EXAM:    Constitutional: No acute distress, well-developed, non-toxic appearing  HEENT: masked, good phonation, not icteric  Neck: supple, no lymphadenopathy  Respiratory: clear to ascultation bilaterally, no wheezing  Cardiovascular: S1 and S2, regular rate and rhythm, no murmurs rubs or gallops  Gastrointestinal: soft, non-tender, non-distended, +bowel sounds, no rebound or guarding, no surgical scars, no drains  Extremities: No peripheral edema, no cyanosis or clubbing  Vascular: 2+ peripheral pulses, no venous stasis  Neurological: A/O x 3, no focal deficits, no asterixis  Psychiatric: Normal mood, normal affect  Skin: No rashes, not jaundiced    LABS:                        13.3   11.91 )-----------( 335      ( 13 Sep 2023 04:56 )             38.9     09-13    136  |  102  |  9   ----------------------------<  127<H>  3.7   |  29  |  0.67    Ca    9.1      13 Sep 2023 04:56    TPro  8.0  /  Alb  3.6  /  TBili  0.7  /  DBili  x   /  AST  17  /  ALT  26  /  AlkPhos  79  09-13      LIVER FUNCTIONS - ( 13 Sep 2023 04:56 )  Alb: 3.6 g/dL / Pro: 8.0 gm/dL / ALK PHOS: 79 U/L / ALT: 26 U/L / AST: 17 U/L / GGT: x             RADIOLOGY & ADDITIONAL STUDIES:
Patient is a 63y old  Female who presents with a chief complaint of Abd pain  persistent vomiting (11 Sep 2023 20:20)      SUBJECTIVE:   HPI:  Pt is a pleasant  62 y/o female with a PMHx of DM, kidney stones, ureteral stent in 2018 who  presents to [place]  ED c/o lower abd pain and n/v/d since 4am yesterday morning.   Pt reported subjective  fevers, chills, fatigue and had 15-16 episodes of vomiting yesterday, with 3-4 loose stools .  She denies dark or bloody stools.  Pt reports vomiting with bilious looking emesis.      Pt reports she went to a party in [place] the night before ( two days ago) onset of symptoms.    She ate chicken marsala and denies knowing anyone else  who was ill, no known sick contacts.   Pt reports 6-7 /10 lower abd pain aching, dull and  non- radiating.  Pt also reports 7/10 epigastric pain.    Pt has severe nausea today and has been unable to eat or drink.   Pt reports burning urination yesterday but  denies urinary complaints now.   Pt reports usually having constipation, with a bowel movement every 3-4 days.   She has been on Ozempic for 4 months , with weight loss of 20 lbs.  She reports her Hg A1C was 6.2 last week.      Pt denies respiratory symptoms, no  cough, no SOB, no HA, no flank pain,  no edema.    Pt reports her last colonoscopy was 19 years ago.       Allergies: Penicillin.  (11 Sep 2023 20:20)      9/12: alert, feeling better, had some nausea this morning, resolved after compazine. then was able to tolerate clears        REVIEW OF SYSTEMS:    CONSTITUTIONAL: No weakness, fevers or chills  EYES/ENT: No visual changes;  No vertigo or throat pain   NECK: No pain or stiffness  RESPIRATORY: No cough, wheezing, hemoptysis; No shortness of breath  CARDIOVASCULAR: No chest pain or palpitations  GASTROINTESTINAL: No abdominal or epigastric pain. No nausea, vomiting, or hematemesis; No diarrhea or constipation. No melena or hematochezia.  GENITOURINARY: No dysuria, frequency or hematuria  NEUROLOGICAL: No numbness or weakness  SKIN: No itching, burning, rashes, or lesions   All other review of systems is negative unless indicated above      Weight (kg): 72.1 (09-11 @ 23:09)    Vital Signs Last 24 Hrs  T(C): 37.2 (12 Sep 2023 07:47), Max: 37.2 (12 Sep 2023 07:47)  T(F): 99 (12 Sep 2023 07:47), Max: 99 (12 Sep 2023 07:47)  HR: 81 (12 Sep 2023 07:47) (80 - 83)  BP: 144/79 (12 Sep 2023 07:47) (142/71 - 152/88)  BP(mean): 107 (11 Sep 2023 18:37) (107 - 107)  RR: 18 (12 Sep 2023 07:47) (17 - 18)  SpO2: 100% (12 Sep 2023 07:47) (98% - 100%)    Parameters below as of 12 Sep 2023 07:47  Patient On (Oxygen Delivery Method): room air        PHYSICAL EXAM:    Constitutional: NAD, awake and alert, well-developed  HEENT: PERR, EOMI, Normal Hearing, MMM  Neck: Soft and supple, No LAD, No JVD  Respiratory: Breath sounds are clear bilaterally, No wheezing, rales or rhonchi  Cardiovascular: S1 and S2, regular rate and rhythm, no Murmurs, gallops or rubs  Gastrointestinal: Bowel Sounds present, soft, nontender, nondistended, no guarding, no rebound  Extremities: No peripheral edema  Vascular: 2+ peripheral pulses  Neurological: A/O x 3, no focal deficits  Musculoskeletal: 5/5 strength b/l upper and lower extremities  Skin: No rashes    MEDICATIONS:  MEDICATIONS  (STANDING):  influenza   Vaccine 0.5 milliLiter(s) IntraMuscular once  sodium chloride 0.9%. 1000 milliLiter(s) (100 mL/Hr) IV Continuous <Continuous>      LABS: All Labs Reviewed:                        13.3   13.87 )-----------( 355      ( 12 Sep 2023 08:47 )             39.8     09-12    132<L>  |  100  |  13  ----------------------------<  139<H>  3.6   |  27  |  0.84    Ca    9.4      12 Sep 2023 08:47    TPro  9.1<H>  /  Alb  4.2  /  TBili  0.6  /  DBili  x   /  AST  16  /  ALT  30  /  AlkPhos  90  09-11      RADIOLOGY/EKG:      < from: CT Abdomen and Pelvis w/ IV Cont (09.11.23 @ 10:50) >  IMPRESSION:  No acute inflammatory or obstructive pathology. No diagnostic findings to   account for the symptomatology        --- End of Report ---    KADE BENAVIDES MD; Attending Radiologist  This document has been electronically signed. Sep 11 2023 10:58AM    < end of copied text >

## 2023-09-13 NOTE — DISCHARGE NOTE PROVIDER - CARE PROVIDER_API CALL
Margaux Miranda [facility]  [address], [address]  [place] [id number]  Phone: [phone]  Fax: [phone]  Follow Up Time:

## 2023-09-16 LAB
CULTURE RESULTS: SIGNIFICANT CHANGE UP
CULTURE RESULTS: SIGNIFICANT CHANGE UP
SPECIMEN SOURCE: SIGNIFICANT CHANGE UP
SPECIMEN SOURCE: SIGNIFICANT CHANGE UP

## 2023-09-23 DIAGNOSIS — Z96.651 PRESENCE OF RIGHT ARTIFICIAL KNEE JOINT: ICD-10-CM

## 2023-09-23 DIAGNOSIS — A08.4 VIRAL INTESTINAL INFECTION, UNSPECIFIED: ICD-10-CM

## 2023-09-23 DIAGNOSIS — Z79.85 LONG-TERM (CURRENT) USE OF INJECTABLE NON-INSULIN ANTIDIABETIC DRUGS: ICD-10-CM

## 2023-09-23 DIAGNOSIS — Z87.442 PERSONAL HISTORY OF URINARY CALCULI: ICD-10-CM

## 2023-09-23 DIAGNOSIS — Z88.0 ALLERGY STATUS TO PENICILLIN: ICD-10-CM

## 2023-09-23 DIAGNOSIS — Z98.51 TUBAL LIGATION STATUS: ICD-10-CM

## 2023-09-23 DIAGNOSIS — E86.0 DEHYDRATION: ICD-10-CM

## 2023-09-23 DIAGNOSIS — E11.9 TYPE 2 DIABETES MELLITUS WITHOUT COMPLICATIONS: ICD-10-CM

## 2023-09-23 DIAGNOSIS — Z20.822 CONTACT WITH AND (SUSPECTED) EXPOSURE TO COVID-19: ICD-10-CM

## 2023-10-20 NOTE — H&P ADULT - SOCIAL HISTORY
Reason for Disposition  • Requesting regular office appointment     Pt requesting to change his appt to later date.  No avail appt. Pt decline to see other providers within same practice and states he will call his PCP at a later time. Pt would like his appt canceled on 10/23/23    Protocols used: INFORMATION ONLY CALL - NO TRIAGE-A-    Pt declining triage and states he did not want to speak to a nurse, he called to change an appt.   No

## 2024-01-23 NOTE — ED PROVIDER NOTE - OBJECTIVE STATEMENT
56 y/o female without PMH here with severe abd pain. Patient reports being awaken by severe periumbilical pain rated 10/10 this AM and associated with 3 episodes of NBNB vomiting and diaphoresis. Also reports mild right sided CP. Symptoms resolved before patient reached the ER. Denies fever, chills, SOB, cough, hemoptysis, palpitations, HA, lightheadedness/vertigo, melena, BRBPR, back pain, focal weakness, paresthesias or urinary symptoms. [Never (0 pts)] : Never (0 points) [No] : In the past 12 months have you used drugs other than those required for medical reasons? No [No falls in past year] : Patient reported no falls in the past year [Little interest or pleasure doing things] : 1) Little interest or pleasure doing things [Feeling down, depressed, or hopeless] : 2) Feeling down, depressed, or hopeless [0] : 2) Feeling down, depressed, or hopeless: Not at all (0) [I have developed a follow-up plan documented below in the note.] : I have developed a follow-up plan documented below in the note. [Audit-CScore] : 0 [With Patient/Caregiver] : , with patient/caregiver [Reviewed no changes] : Reviewed, no changes [Name: ___] : Health Care Proxy's Name: [unfilled]  [Designated Healthcare Proxy] : Designated healthcare proxy [Relationship: ___] : Relationship: [unfilled] [Aggressive treatment] : aggressive treatment [I will adhere to the patient's wishes.] : I will adhere to the patient's wishes. [Former] : Former [AdvancecareDate] : 01/24 [10-14] : 10-14 [> 15 Years] : > 15 Years

## 2024-01-28 ENCOUNTER — NON-APPOINTMENT (OUTPATIENT)
Age: 64
End: 2024-01-28

## 2024-02-01 NOTE — DIETITIAN INITIAL EVALUATION ADULT - WEIGHT USED FOR CALCULATIONS
Patient: Keisha Bahena Date: 2024   : 1928 Attending: No att. providers found   89 year old female      Chief Complaint   Patient presents with    VERONIQUE (acute kidney injury)    Chronic Kidney Disease     CKD4    Proteinuria    Hypertension    Follow-up     S/P Hospitalization       HPI: Keisha is a 89 year old female with HTN, HFrEF S/P ICD, hypothyroidism, CKD4 and COPD who presents today for follow up and management of VERONIQUE, CKD, HTN and Proteinuria.S/P Hospitalization.     24  Hospitalized recently for UTI.   Feels better today.   Appetite has improved.   Leg edema is stable.     23  Fell recently.   Less appetite. Eating small portions.  Leg edema is much better.     23  Legs are more swollen.   BP is controlled.   No SOB.     23  Sudden sensorineural hearing loss 1.5 week ago, seen by ENT now on Prednisone.   Feels ok.    No leg edema or urinary symptoms.   No SOB.   UTI recently treated with Cipro.       23  Feels good.   No new complaints for today.   Fell recently. No injuries.   No SOB or Chest pain.  No urinary symptoms.   No leg edema.     23  Pt feels good and does not have any new complaints for today.  No Recent ER visit or hospitalization.   No SOB, or Chest pain.   No Urinary symptoms or hematuria.   No leg edema  HTN-usually well controlled.  No h/o gout, kidney stones, recurrent UTI's, or familial h/o renal problems.  No h/o chronic NSAID use    Denies any appetite changes, wt changes.  Denies CP,  PND, orthopnea, LE edema, palpitations, cough, hemoptysis, sorethroat,  hematuria, polyuria, dysuria, frothy urine.  Denies fevers, chills, anorexia, skin rash, n/v/c/d, etc.       ROS: All systems reviewed and negative except for what is mentioned in the HPI.      Past Medical History:   Diagnosis Date    Acute cystitis without hematuria 3/19/2021    Arthritis     Asthma     B12 deficiency 3/30/2013    Bilateral pseudophakia     Cardiomyopathy due to  hypertension (CMD) 1990s    Cataract     CKD (chronic kidney disease), stage IV (CMD)     Colon polyps 2012    Depression     Diverticulosis     Hemorrhoids     History of recent fall 2018    Hyperopia 2014    HYPERTENSION NOS 1970s    Hypothyroidism     Iron deficiency anemia     aranesp    Left lower lobe pneumonia 2018    Leg length discrepancy     LIPOID METABOL DIS NOS 2005    Low iron gets iron infusions    Memory loss     Polyp of large intestine 2012    Polypharmacy     Positive blood cultures 3/19/2021    Right shoulder pain 2018    Scoliosis     degenerative    Seasonal allergies     Thyroid condition     Urge incontinence 2013    Urinary tract infection, E. coli 2018     Past Surgical History:   Procedure Laterality Date    Appendectomy      Cataract extraction, bilateral Bilateral     Colonoscopy  2012    polyp    Eye surgery      Hernia repair      Joint replacement      Pacemaker  2009    plus defibrilator -  replaced 18    Removal gallbladder  1988    Cholecystectomy    Removal of prosthetic mesh      Repair of rectocele  2005    Total abdom hysterectomy  1974    TRACY w BSO    Total knee arthroplasty  2012    right     Social History     Socioeconomic History    Marital status:      Spouse name: Not on file    Number of children: 5    Years of education: Not on file    Highest education level: Not on file   Occupational History    Occupation: /factory     Comment: retired   Tobacco Use    Smoking status: Former     Current packs/day: 0.00     Types: Cigarettes     Quit date: 10/31/1992     Years since quittin.2     Passive exposure: Never    Smokeless tobacco: Never   Vaping Use    Vaping Use: Not on file   Substance and Sexual Activity    Alcohol use: No     Alcohol/week: 0.0 standard drinks of alcohol    Drug use: No    Sexual activity: Not Currently     Partners: Male   Other Topics Concern    Not on file    Social History Narrative    Lives alone in [place] in an Apartment and daughter Leatha under her    What are your living arrangements? alone        What type of residence do you live in?  Apartment, has elevator        Any potential patient safety issues? At risk for falls        Do you drive yourself? family        Any financial problems?  none     Social Determinants of Health     Financial Resource Strain: Low Risk  (12/8/2023)    Financial Resource Strain     Unable to Get: None   Food Insecurity: Not At Risk (12/7/2023)    Food Insecurity     Food Insecurity: Worried or Stressed about Money for Food: Rarely   Transportation Needs: No Transportation Needs (12/14/2023)    OASIS : Transportation     Lack of Transportation (Medical): No     Lack of Transportation (Non-Medical): No     Patient Unable or Declines to Respond: No   Physical Activity: High Risk (5/5/2023)    Exercise Vital Sign     Days of Exercise per Week: 0 days     Minutes of Exercise per Session: 0 min   Stress: Not on file   Social Connections: Feeling Socially Integrated (12/14/2023)    OASIS : Social Isolation     Frequency of experiencing loneliness or isolation: Never   Interpersonal Safety: Not At Risk (12/7/2023)    Interpersonal Safety     Social Determinants: Intimate Partner Violence Past Fear: No     Social Determinants: Intimate Partner Violence Current Fear: No     Family History   Problem Relation Age of Onset    Cataracts Mother     Diabetes Sister     Diabetes Sister     Diabetes Sister     Diabetes Brother     Parkinsonism Brother     Cataracts Daughter     Cancer Daughter         basal cell carcinoma    Other Daughter     Cancer Daughter         breast and ovarian cancer     ALLERGIES:   Allergen Reactions    Amlodipine      Patient has a flushed face and her head felt like it was going to \"blow up\"    Bactrim Ds MYALGIA    Captopril Cough     Other reaction(s): Other (See Comments)  cough    Gabapentin Other (See  Comments)     Other reaction(s): Other (See Comments)    Sulfamethoxazole-Trimethoprim MYALGIA     Other reaction(s): Other (See Comments)    Tape [Adhesive   (Environmental)] ERYTHEMA     Skin irritation, use  paper tape instead     Hydralazine Palpitations     Other reaction(s): Palpitations       Medications:  Current Outpatient Medications   Medication Sig Dispense Refill    carvedilol (COREG) 25 MG tablet Take 1 tablet by mouth every 12 hours. 60 tablet 1    ALPRAZolam (XANAX) 1 MG tablet TAKE 1 TABLET BY MOUTH THREE TIMES DAILY AS NEEDED FOR ANXIETY 45 tablet 0    potassium chloride (KLOR-CON M) 10 MEQ nicky ER tablet Take 2 tablets by mouth once daily 180 tablet 0    rosuvastatin (CRESTOR) 10 MG tablet Take 1 tablet by mouth daily. 90 tablet 1    Glycerin-Polysorbate 80 (REFRESH DRY EYE THERAPY OP) Apply 1 drop to eye daily as needed (dry eyes).      BIOTIN FORTE PO Take 1 tablet by mouth daily.      diphenhydrAMINE HCl (BENADRYL ALLERGY PO) Take 2 tablets by mouth every 6 hours as needed (itch).      hydrOXYzine (ATARAX) 25 MG tablet Take 25 mg by mouth every 4 hours as needed for Itching.      furosemide (LASIX) 20 MG tablet 20 mg daily except MWF 40 mg. 180 tablet 1    losartan (COZAAR) 25 MG tablet Take 1 tablet by mouth once daily 180 tablet 0    levothyroxine 50 MCG tablet Take 1 tablet by mouth once daily 90 tablet 1    donepezil (ARICEPT) 10 MG tablet TAKE 1 TABLET BY MOUTH ONCE DAILY AT BEDTIME 90 tablet 1    Multiple Minerals-Vitamins (ROBERT-MAG-ZINC-D PO) Take 1 tablet by mouth daily.      amitriptyline (ELAVIL) 25 MG tablet TAKE 2 TABLETS BY MOUTH AT BEDTIME 180 tablet 1    melatonin 5 MG Take 1 mg by mouth nightly.      Ferrous Sulfate (IRON PO) Take by mouth daily.      albuterol (Ventolin HFA) 108 (90 Base) MCG/ACT inhaler Inhale 2 puffs into the lungs every 4 hours as needed for Shortness of Breath or Wheezing. 1 each 11    cetirizine (ZyrTEC) 5 MG tablet Take 1 tablet by mouth daily for 14 days.  14 tablet 0    APPLE CIDER VINEGAR PO Take 2-3 capsules by mouth daily.      albuterol (VENTOLIN) (2.5 MG/3ML) 0.083% nebulizer solution Take 3 mLs by nebulization every 4 hours. This is for asthma exacerbation and acute bronchitis 375 mL 0    cyanocobalamin (VITAMIN B-12) 100 MCG tablet Take 50 mcg by mouth daily.      hydroCORTisone (CORTIZONE) 2.5 % cream Apply twice a day to rash under breasts as needed. 30 g 2    ketoconazole (NIZORAL) 2 % cream Apply twice a day to rash under breasts as needed 60 g 6    ciclopirox (PENLAC) 8 % topical solution Apply topically to all toenails at bedtime. Clean with nail polish remover after 3 days. 6 mL 2    acetaminophen (TYLENOL) 500 MG tablet Take 1,000 mg by mouth every 6 hours as needed for Pain (Back).      VITAMIN D, CHOLECALCIFEROL, PO Take 4,000 Units by mouth every evening.       aspirin 81 MG tablet Take 81 mg by mouth Every evening.        No current facility-administered medications for this visit.       Immunization Status:  Immunization History   Administered Date(s) Administered    COVID Moderna 0.5 mL 12Y+ 01/29/2021, 02/27/2021, 10/28/2021    COVID Moderna Bivalent 0.5 mL 12Y+ 11/08/2022    COVID Moderna Booster 0.25 mL 12Y+ 05/19/2022    Influenza Whole 11/01/2003    Influenza, High Dose quadrivalent, preserve-free 09/17/2020, 09/30/2021, 10/21/2022, 10/20/2023    Influenza, Unspecified Formulation 10/10/1995    Influenza, high dose seasonal, preservative-free 10/09/2012, 10/07/2016, 10/24/2017, 11/29/2018    Influenza, quadrivalent, multi-dose 10/08/2014, 10/17/2015    Influenza, quadrivalent, preserve-free 10/10/2019    Influenza, seasonal, injectable, preservative free 11/05/2013    Influenza, seasonal, injectable, trivalent 10/10/1995, 11/28/2005, 09/29/2010, 10/09/2012, 10/07/2016    Pneumococcal Conjugate 13 Valent Vacc (Prevnar 13) 06/24/2016, 10/07/2016    Pneumococcal Polysaccharide Vacc (Pneumovax 23) 10/10/1995, 01/01/1999, 10/09/2012    Shingrix  (Shingles Zoster) 04/06/2022, 07/13/2022    Tdap 07/14/2011, 07/13/2022     Immunization History   Administered Date(s) Administered    COVID Moderna 0.5 mL 12Y+ 01/29/2021, 02/27/2021, 10/28/2021    COVID Moderna Bivalent 0.5 mL 12Y+ 11/08/2022    COVID Moderna Booster 0.25 mL 12Y+ 05/19/2022    Influenza Whole 11/01/2003    Influenza, High Dose quadrivalent, preserve-free 09/17/2020, 09/30/2021, 10/21/2022, 10/20/2023    Influenza, Unspecified Formulation 10/10/1995    Influenza, high dose seasonal, preservative-free 10/09/2012, 10/07/2016, 10/24/2017, 11/29/2018    Influenza, quadrivalent, multi-dose 10/08/2014, 10/17/2015    Influenza, quadrivalent, preserve-free 10/10/2019    Influenza, seasonal, injectable, preservative free 11/05/2013    Influenza, seasonal, injectable, trivalent 10/10/1995, 11/28/2005, 09/29/2010, 10/09/2012, 10/07/2016    Pneumococcal Conjugate 13 Valent Vacc (Prevnar 13) 06/24/2016, 10/07/2016    Pneumococcal Polysaccharide Vacc (Pneumovax 23) 10/10/1995, 01/01/1999, 10/09/2012    Shingrix (Shingles Zoster) 04/06/2022, 07/13/2022    Tdap 07/14/2011, 07/13/2022                 1/10/2024     1:36 PM 1/17/2024     1:22 PM 1/22/2024    12:47 PM 1/22/2024     1:01 PM 1/24/2024     1:41 PM 1/31/2024    12:53 PM 2/1/2024    11:10 AM   Vitals   SYSTOLIC 130 138 146 151 138 142 128   DIASTOLIC 62 64 62 79 76 62 78   Heart Rate 70 70 72 70 71 71 68   Temp 99.1 °F (37.3 °C) 98.2 °F (36.8 °C)   99.1 °F (37.3 °C) 97.8 °F (36.6 °C)    Resp 16 16    17    Weight kg   59.149 kg    57.153 kg   Height   5' 0\"    5' 0\"   BMI (Calculated)   25.47    24.61     Physical Exam:    GENERAL: Well-developed, well nourished.  No distress  HEENT: Normocephalic, atraumatic. No pallor.  No icterus. Oral mucous membranes moist.    NECK: Supple, No JVD.  No thyromegaly.  No masses. Trachea midline.   CHEST: Normal effort.  Symmetric air entry. Clear to ausculation bilaterally.  No rhonchi, wheezes or crackles.   HEART:  Regular rate and rhythm. S1, S2.  No rub  ABDOMEN: Soft, Non-tender  EXTREMITIES: No cyanosis or clubbing.  trace edema.  NEURO: A&O x 3.  Moves all extremities. Grossly intact.  SKIN: No rashes.  PYSCH:  Appropriate mood and affect.    Laboratory Results:    Recent Labs     02/08/23  1236 03/06/23  1336 06/20/23  1234 09/06/23  1524 09/17/23  0359 10/20/23  1403 11/03/23  1233 12/05/23  1334 12/07/23  1543 12/08/23  0532 12/09/23  0725 12/10/23  0541 12/11/23  0542 01/22/24  1405   SODIUM 141   < > 140   < > 139   < > 141   < > 141   < > 141 141 141 144   POTASSIUM 3.5   < > 3.8   < > 3.9   < > 3.6   < > 4.2   < > 3.5 3.2* 4.1 4.2   CHLORIDE 105   < > 102   < > 101   < > 103   < > 103   < > 106 104 106 105   CO2 28   < > 32   < > 29   < > 28   < > 31   < > 29 29 29 33*   ANIONGAP 12   < > 10   < > 13   < > 14   < > 11   < > 10 11 10 10   BUN 20   < > 27*   < > 20   < > 32*   < > 30*   < > 16 20 22* 27*   CREATININE 1.56*   < > 1.63*   < > 1.51*   < > 2.09*   < > 1.84*   < > 1.30* 1.39* 1.36* 1.77*   GLUCOSE 92   < > 94   < > 93   < > 107*   < > 84   < > 119* 102* 95 92   CALCIUM 9.1   < > 9.0   < > 8.7   < > 9.4   < > 9.3   < > 8.8 8.6 8.7 8.8   RADHA 1.22  --  1.25  --   --   --   --   --   --   --   --   --   --   --    MG  --   --   --   --   --   --   --    < > 2.1  --  2.0 1.9 2.0  --    ALBUMIN 3.8   < >  --   --  3.3*  --  3.6  --  3.3*  --   --   --   --   --     < > = values in this interval not displayed.     Recent Labs     02/08/23  1236 06/20/23  1234   PHOS 3.3 4.5   INTAC 102* 77   VITD25 35.5 43.3         Recent Labs     05/10/23  1116 09/17/23  0359 12/05/23  1334 12/07/23  1543 12/10/23  0541 12/11/23  0542 01/22/24  1405   WBC 6.1   < > 5.6   < > 6.2 5.4 4.9   HGB 11.0*   < > 11.7*   < > 11.5* 11.3* 10.9*   HCT 33.9*   < > 38.3   < > 36.5 36.8 35.5*      < > 155   < > 134* 136* 147   FERR 94  --  43  --   --   --   --    PST 22  --  17  --   --   --   --     < > = values in this interval not  displayed.             Urine Panel  Recent Labs     05/02/23  0107 10/20/23  1403 12/07/23  1556   USPG 1.008 1.014 1.014   UPH 6.0 6.0 7.5*   UPROT Negative Negative Trace*   UROB 0.2 0.2 0.2   UNITR Positive* Positive* Positive*   UKET Negative Negative Negative   UBILI Negative Negative Negative   UWBC Small* Large* Large*   URBC Negative Negative Negative         Imaging Study:  Images reviewed.    Diagnosis/Plan:    # VERONIQUE on CKD Stage 3b-4/A2, Likely due to Decreased PO intake and over diuresis. Improved.   - Crea base line between mid to high 1s, up to 2, now back to base line.   - eGFR by Cystatin C is 24 ml/min  - Serology work up negative previously.   - UA bland, no proteinuria or hematuria.   - ACR 70 mg/gr  - No chronic NSAIDs use.   - I have discussed the nature and course of chronic kidney disease, the stages and progression over time. We have discussed the importance of BP control as a means of ameliorating the progression of CKD.  - Avoid NSAIDS  - Follow up in 2 months with labs as ordered.     # Hypertension:   - Controlled.   - Goal is to keep BP <140/80. Low sodium diet reviewed  - On Carvedilol 25 mg bid, Losartan 25 mg daily, and Lasix.   - Discontinue Losartan       # Secondary Hyperparathyroidism   - Ca at goal.   -  >> 102>>77  - Vit D wnl.       # Anemia of CKD  - Hgb at Goal.       # HFrEF: Compensating.   # Leg edema improving.   - EF 50% S/P ICD  - on Lasix 20 mg daily except MWF 40 mg.       D/w pt. All questions answered. The patient indicated understanding of the diagnosis and agreed with the plan of care. RTC with pre clinic labs.     Merissa Cline MD  [facility]   current weight

## 2024-05-06 NOTE — PATIENT PROFILE ADULT. - TEACHING/LEARNING LEARNING PREFERENCES
home medication(s): lipitor, does not recall dose    - medication reconciliation in am, resume as-appropriate audio

## 2024-12-18 NOTE — ED ADULT NURSE NOTE - OBJECTIVE STATEMENT
used
Pt presents to ED via EMS, pt alert and orietnedx4, VSS afebreile, pt c/o abdominal and chest pain that started at 4am today accompanied by nausea, vomitting, diarrhea. pt states that she also had cold sweats and denies fever. Pt states this has never happened before. States she ate out at a [employer] function last night and states hermaddisbamd ate the same food and was not affected. pt states this does not feel like food poisoning. All needs anticipated and met, safety maintained will continue to monitr

## 2025-08-26 ENCOUNTER — NON-APPOINTMENT (OUTPATIENT)
Age: 65
End: 2025-08-26

## 2025-08-27 ENCOUNTER — APPOINTMENT (OUTPATIENT)
Dept: OBGYN | Facility: CLINIC | Age: 65
End: 2025-08-27
Payer: COMMERCIAL

## 2025-08-27 DIAGNOSIS — N84.0 POLYP OF CORPUS UTERI: ICD-10-CM

## 2025-08-27 DIAGNOSIS — Z78.0 ASYMPTOMATIC MENOPAUSAL STATE: ICD-10-CM

## 2025-08-27 DIAGNOSIS — D25.9 LEIOMYOMA OF UTERUS, UNSPECIFIED: ICD-10-CM

## 2025-08-27 DIAGNOSIS — N95.0 POSTMENOPAUSAL BLEEDING: ICD-10-CM

## 2025-08-27 DIAGNOSIS — N88.2 STRICTURE AND STENOSIS OF CERVIX UTERI: ICD-10-CM

## 2025-08-27 PROCEDURE — 99204 OFFICE O/P NEW MOD 45 MIN: CPT

## 2025-08-27 PROCEDURE — 99459 PELVIC EXAMINATION: CPT

## 2025-09-02 ENCOUNTER — OUTPATIENT (OUTPATIENT)
Dept: OUTPATIENT SERVICES | Facility: HOSPITAL | Age: 65
LOS: 1 days | End: 2025-09-02
Payer: COMMERCIAL

## 2025-09-02 VITALS
OXYGEN SATURATION: 100 % | HEIGHT: 64 IN | RESPIRATION RATE: 16 BRPM | HEART RATE: 76 BPM | DIASTOLIC BLOOD PRESSURE: 67 MMHG | SYSTOLIC BLOOD PRESSURE: 120 MMHG | TEMPERATURE: 98 F | WEIGHT: 177.91 LBS

## 2025-09-02 DIAGNOSIS — Z96.659 PRESENCE OF UNSPECIFIED ARTIFICIAL KNEE JOINT: Chronic | ICD-10-CM

## 2025-09-02 DIAGNOSIS — Z98.51 TUBAL LIGATION STATUS: Chronic | ICD-10-CM

## 2025-09-02 DIAGNOSIS — Z01.818 ENCOUNTER FOR OTHER PREPROCEDURAL EXAMINATION: ICD-10-CM

## 2025-09-02 DIAGNOSIS — Z98.890 OTHER SPECIFIED POSTPROCEDURAL STATES: Chronic | ICD-10-CM

## 2025-09-02 DIAGNOSIS — Z98.891 HISTORY OF UTERINE SCAR FROM PREVIOUS SURGERY: Chronic | ICD-10-CM

## 2025-09-02 DIAGNOSIS — Z98.41 CATARACT EXTRACTION STATUS, RIGHT EYE: Chronic | ICD-10-CM

## 2025-09-02 LAB
A1C WITH ESTIMATED AVERAGE GLUCOSE RESULT: 7.8 % — HIGH (ref 4–5.6)
ANION GAP SERPL CALC-SCNC: 3 MMOL/L — LOW (ref 5–17)
BASOPHILS # BLD AUTO: 0.05 K/UL — SIGNIFICANT CHANGE UP (ref 0–0.2)
BASOPHILS NFR BLD AUTO: 0.5 % — SIGNIFICANT CHANGE UP (ref 0–2)
BLD GP AB SCN SERPL QL: SIGNIFICANT CHANGE UP
BUN SERPL-MCNC: 18 MG/DL — SIGNIFICANT CHANGE UP (ref 7–23)
CALCIUM SERPL-MCNC: 8.7 MG/DL — SIGNIFICANT CHANGE UP (ref 8.5–10.1)
CHLORIDE SERPL-SCNC: 112 MMOL/L — HIGH (ref 96–108)
CO2 SERPL-SCNC: 22 MMOL/L — SIGNIFICANT CHANGE UP (ref 22–31)
CREAT SERPL-MCNC: 0.79 MG/DL — SIGNIFICANT CHANGE UP (ref 0.5–1.3)
EGFR: 84 ML/MIN/1.73M2 — SIGNIFICANT CHANGE UP
EGFR: 84 ML/MIN/1.73M2 — SIGNIFICANT CHANGE UP
EOSINOPHIL # BLD AUTO: 0.17 K/UL — SIGNIFICANT CHANGE UP (ref 0–0.5)
EOSINOPHIL NFR BLD AUTO: 1.8 % — SIGNIFICANT CHANGE UP (ref 0–6)
ESTIMATED AVERAGE GLUCOSE: 177 MG/DL — HIGH (ref 68–114)
GLUCOSE SERPL-MCNC: 191 MG/DL — HIGH (ref 70–99)
HCT VFR BLD CALC: 40.3 % — SIGNIFICANT CHANGE UP (ref 34.5–45)
HGB BLD-MCNC: 12.9 G/DL — SIGNIFICANT CHANGE UP (ref 11.5–15.5)
IMM GRANULOCYTES # BLD AUTO: 0.03 K/UL — SIGNIFICANT CHANGE UP (ref 0–0.07)
IMM GRANULOCYTES NFR BLD AUTO: 0.3 % — SIGNIFICANT CHANGE UP (ref 0–0.9)
LYMPHOCYTES # BLD AUTO: 3.32 K/UL — HIGH (ref 1–3.3)
LYMPHOCYTES NFR BLD AUTO: 35.6 % — SIGNIFICANT CHANGE UP (ref 13–44)
MCHC RBC-ENTMCNC: 26.5 PG — LOW (ref 27–34)
MCHC RBC-ENTMCNC: 32 G/DL — SIGNIFICANT CHANGE UP (ref 32–36)
MCV RBC AUTO: 82.8 FL — SIGNIFICANT CHANGE UP (ref 80–100)
MONOCYTES # BLD AUTO: 0.62 K/UL — SIGNIFICANT CHANGE UP (ref 0–0.9)
MONOCYTES NFR BLD AUTO: 6.7 % — SIGNIFICANT CHANGE UP (ref 2–14)
NEUTROPHILS # BLD AUTO: 5.13 K/UL — SIGNIFICANT CHANGE UP (ref 1.8–7.4)
NEUTROPHILS NFR BLD AUTO: 55.1 % — SIGNIFICANT CHANGE UP (ref 43–77)
NRBC # BLD AUTO: 0 K/UL — SIGNIFICANT CHANGE UP (ref 0–0)
NRBC # FLD: 0 K/UL — SIGNIFICANT CHANGE UP (ref 0–0)
NRBC BLD AUTO-RTO: 0 /100 WBCS — SIGNIFICANT CHANGE UP (ref 0–0)
PLATELET # BLD AUTO: 319 K/UL — SIGNIFICANT CHANGE UP (ref 150–400)
PMV BLD: 9.5 FL — SIGNIFICANT CHANGE UP (ref 7–13)
POTASSIUM SERPL-MCNC: 3.6 MMOL/L — SIGNIFICANT CHANGE UP (ref 3.5–5.3)
POTASSIUM SERPL-SCNC: 3.6 MMOL/L — SIGNIFICANT CHANGE UP (ref 3.5–5.3)
RBC # BLD: 4.87 M/UL — SIGNIFICANT CHANGE UP (ref 3.8–5.2)
RBC # FLD: 14.6 % — HIGH (ref 10.3–14.5)
SODIUM SERPL-SCNC: 137 MMOL/L — SIGNIFICANT CHANGE UP (ref 135–145)
WBC # BLD: 9.32 K/UL — SIGNIFICANT CHANGE UP (ref 3.8–10.5)
WBC # FLD AUTO: 9.32 K/UL — SIGNIFICANT CHANGE UP (ref 3.8–10.5)

## 2025-09-02 PROCEDURE — 83036 HEMOGLOBIN GLYCOSYLATED A1C: CPT

## 2025-09-02 PROCEDURE — 36415 COLL VENOUS BLD VENIPUNCTURE: CPT

## 2025-09-02 PROCEDURE — 86900 BLOOD TYPING SEROLOGIC ABO: CPT

## 2025-09-02 PROCEDURE — 99214 OFFICE O/P EST MOD 30 MIN: CPT | Mod: 25

## 2025-09-02 PROCEDURE — 80048 BASIC METABOLIC PNL TOTAL CA: CPT

## 2025-09-02 PROCEDURE — 86901 BLOOD TYPING SEROLOGIC RH(D): CPT

## 2025-09-02 PROCEDURE — 86850 RBC ANTIBODY SCREEN: CPT

## 2025-09-02 PROCEDURE — 85025 COMPLETE CBC W/AUTO DIFF WBC: CPT

## 2025-09-03 DIAGNOSIS — Z01.818 ENCOUNTER FOR OTHER PREPROCEDURAL EXAMINATION: ICD-10-CM

## 2025-09-08 ENCOUNTER — APPOINTMENT (OUTPATIENT)
Dept: OBGYN | Facility: CLINIC | Age: 65
End: 2025-09-08

## 2025-09-08 VITALS
DIASTOLIC BLOOD PRESSURE: 76 MMHG | WEIGHT: 179 LBS | TEMPERATURE: 97.8 F | SYSTOLIC BLOOD PRESSURE: 134 MMHG | HEART RATE: 92 BPM | BODY MASS INDEX: 30.56 KG/M2 | HEIGHT: 64 IN

## 2025-09-08 DIAGNOSIS — D25.9 LEIOMYOMA OF UTERUS, UNSPECIFIED: ICD-10-CM

## 2025-09-08 DIAGNOSIS — N95.0 POSTMENOPAUSAL BLEEDING: ICD-10-CM

## 2025-09-08 DIAGNOSIS — N88.2 STRICTURE AND STENOSIS OF CERVIX UTERI: ICD-10-CM

## 2025-09-08 DIAGNOSIS — N84.0 POLYP OF CORPUS UTERI: ICD-10-CM

## 2025-09-08 LAB — HEMOGLOBIN: 12.2

## 2025-09-09 ENCOUNTER — RESULT REVIEW (OUTPATIENT)
Age: 65
End: 2025-09-09

## 2025-09-09 ENCOUNTER — APPOINTMENT (OUTPATIENT)
Dept: OBGYN | Facility: HOSPITAL | Age: 65
End: 2025-09-09

## 2025-09-09 ENCOUNTER — TRANSCRIPTION ENCOUNTER (OUTPATIENT)
Age: 65
End: 2025-09-09

## 2025-09-09 RX ORDER — METFORMIN HYDROCHLORIDE 750 MG/1
TABLET ORAL
Refills: 0 | Status: ACTIVE | COMMUNITY

## 2025-09-09 RX ORDER — DAPAGLIFLOZIN 5 MG/1
1 TABLET, FILM COATED ORAL
Refills: 0 | DISCHARGE

## 2025-09-09 RX ORDER — POTASSIUM CITRATE 5 MEQ/1
2 TABLET, EXTENDED RELEASE ORAL
Refills: 0 | DISCHARGE

## 2025-09-09 RX ORDER — DAPAGLIFLOZIN 10 MG/1
TABLET, FILM COATED ORAL
Refills: 0 | Status: ACTIVE | COMMUNITY

## 2025-09-09 RX ORDER — METFORMIN HYDROCHLORIDE 850 MG/1
2 TABLET ORAL
Refills: 0 | DISCHARGE

## 2025-09-17 ENCOUNTER — NON-APPOINTMENT (OUTPATIENT)
Age: 65
End: 2025-09-17